# Patient Record
Sex: MALE | Race: BLACK OR AFRICAN AMERICAN | NOT HISPANIC OR LATINO | Employment: FULL TIME | URBAN - METROPOLITAN AREA
[De-identification: names, ages, dates, MRNs, and addresses within clinical notes are randomized per-mention and may not be internally consistent; named-entity substitution may affect disease eponyms.]

---

## 2023-03-16 ENCOUNTER — TELEPHONE (OUTPATIENT)
Dept: PRIMARY CARE | Facility: CLINIC | Age: 58
End: 2023-03-16
Payer: COMMERCIAL

## 2023-03-16 NOTE — TELEPHONE ENCOUNTER
Asking for a note stating he has not been unable to work since January 17th  Needs to be sent to [employer],  [phone]   Claim # [id number]

## 2023-04-17 ENCOUNTER — OFFICE VISIT (OUTPATIENT)
Dept: PRIMARY CARE | Facility: CLINIC | Age: 58
End: 2023-04-17
Payer: COMMERCIAL

## 2023-04-17 VITALS
DIASTOLIC BLOOD PRESSURE: 80 MMHG | OXYGEN SATURATION: 98 % | SYSTOLIC BLOOD PRESSURE: 118 MMHG | HEIGHT: 72 IN | BODY MASS INDEX: 34.54 KG/M2 | HEART RATE: 82 BPM | WEIGHT: 255 LBS

## 2023-04-17 DIAGNOSIS — G62.9 NEUROPATHY: ICD-10-CM

## 2023-04-17 DIAGNOSIS — E11.9 TYPE 2 DIABETES MELLITUS WITHOUT COMPLICATION, WITH LONG-TERM CURRENT USE OF INSULIN (MULTI): ICD-10-CM

## 2023-04-17 DIAGNOSIS — E78.5 HYPERLIPIDEMIA, UNSPECIFIED HYPERLIPIDEMIA TYPE: ICD-10-CM

## 2023-04-17 DIAGNOSIS — E78.5 HYPERLIPIDEMIA, UNSPECIFIED HYPERLIPIDEMIA TYPE: Primary | ICD-10-CM

## 2023-04-17 DIAGNOSIS — M17.0 PRIMARY LOCALIZED OSTEOARTHRITIS OF BOTH KNEES: Primary | ICD-10-CM

## 2023-04-17 DIAGNOSIS — I10 HYPERTENSION, UNSPECIFIED TYPE: ICD-10-CM

## 2023-04-17 DIAGNOSIS — Z79.4 TYPE 2 DIABETES MELLITUS WITHOUT COMPLICATION, WITH LONG-TERM CURRENT USE OF INSULIN (MULTI): ICD-10-CM

## 2023-04-17 DIAGNOSIS — I10 BENIGN ESSENTIAL HYPERTENSION: ICD-10-CM

## 2023-04-17 PROBLEM — R22.30 SKIN LUMP OF ARM: Status: ACTIVE | Noted: 2023-04-17

## 2023-04-17 PROBLEM — M62.830 BACK SPASM: Status: ACTIVE | Noted: 2023-04-17

## 2023-04-17 PROBLEM — M25.561 PAIN IN RIGHT KNEE: Status: ACTIVE | Noted: 2023-04-17

## 2023-04-17 PROBLEM — R91.8 PULMONARY NODULES: Status: ACTIVE | Noted: 2023-04-17

## 2023-04-17 PROBLEM — D17.21 LIPOMA OF RIGHT UPPER EXTREMITY: Status: ACTIVE | Noted: 2023-04-17

## 2023-04-17 PROBLEM — H91.90 HEARING LOSS: Status: ACTIVE | Noted: 2023-04-17

## 2023-04-17 PROBLEM — K21.9 GERD (GASTROESOPHAGEAL REFLUX DISEASE): Status: ACTIVE | Noted: 2023-04-17

## 2023-04-17 PROBLEM — R07.0 THROAT PAIN IN ADULT: Status: ACTIVE | Noted: 2023-04-17

## 2023-04-17 PROBLEM — H52.03 HYPERMETROPIA OF BOTH EYES: Status: ACTIVE | Noted: 2023-04-17

## 2023-04-17 PROBLEM — H52.13 MYOPIA OF BOTH EYES: Status: ACTIVE | Noted: 2023-04-17

## 2023-04-17 PROBLEM — R49.0 HOARSENESS OF VOICE: Status: ACTIVE | Noted: 2023-04-17

## 2023-04-17 PROBLEM — H90.3 BILATERAL SENSORINEURAL HEARING LOSS: Status: ACTIVE | Noted: 2023-04-17

## 2023-04-17 PROBLEM — G89.29 CHRONIC HEADACHES: Status: ACTIVE | Noted: 2023-04-17

## 2023-04-17 PROBLEM — M54.50 LOW BACK PAIN: Status: ACTIVE | Noted: 2023-04-17

## 2023-04-17 PROBLEM — G47.33 OBSTRUCTIVE SLEEP APNEA, ADULT: Status: ACTIVE | Noted: 2023-04-17

## 2023-04-17 PROBLEM — J38.3 LESION OF VOCAL CORD: Status: ACTIVE | Noted: 2023-04-17

## 2023-04-17 PROBLEM — N52.9 ORGANIC IMPOTENCE: Status: ACTIVE | Noted: 2023-04-17

## 2023-04-17 PROBLEM — R51.9 CHRONIC HEADACHES: Status: ACTIVE | Noted: 2023-04-17

## 2023-04-17 PROBLEM — M25.569 KNEE PAIN: Status: ACTIVE | Noted: 2023-04-17

## 2023-04-17 PROBLEM — F17.200 TOBACCO USE DISORDER: Status: ACTIVE | Noted: 2023-04-17

## 2023-04-17 PROBLEM — R22.30 ARM MASS: Status: ACTIVE | Noted: 2023-04-17

## 2023-04-17 PROBLEM — H47.393 OPTIC NERVE CUPPING OF BOTH EYES: Status: ACTIVE | Noted: 2023-04-17

## 2023-04-17 PROCEDURE — 3079F DIAST BP 80-89 MM HG: CPT | Performed by: INTERNAL MEDICINE

## 2023-04-17 PROCEDURE — 3074F SYST BP LT 130 MM HG: CPT | Performed by: INTERNAL MEDICINE

## 2023-04-17 PROCEDURE — 99213 OFFICE O/P EST LOW 20 MIN: CPT | Performed by: INTERNAL MEDICINE

## 2023-04-17 PROCEDURE — 1036F TOBACCO NON-USER: CPT | Performed by: INTERNAL MEDICINE

## 2023-04-17 PROCEDURE — 3051F HG A1C>EQUAL 7.0%<8.0%: CPT | Performed by: INTERNAL MEDICINE

## 2023-04-17 RX ORDER — ATORVASTATIN CALCIUM 10 MG/1
1 TABLET, FILM COATED ORAL DAILY
COMMUNITY
Start: 2017-06-29 | End: 2023-04-17 | Stop reason: SDUPTHER

## 2023-04-17 RX ORDER — GLYBURIDE-METFORMIN HYDROCHLORIDE 2.5; 5 MG/1; MG/1
2 TABLET ORAL 2 TIMES DAILY
COMMUNITY
Start: 2014-06-05 | End: 2023-12-14 | Stop reason: WASHOUT

## 2023-04-17 RX ORDER — DILTIAZEM HYDROCHLORIDE 240 MG/1
CAPSULE, COATED, EXTENDED RELEASE ORAL
COMMUNITY
Start: 2023-04-15 | End: 2023-04-17 | Stop reason: SDUPTHER

## 2023-04-17 RX ORDER — ATORVASTATIN CALCIUM 10 MG/1
TABLET, FILM COATED ORAL
Qty: 90 TABLET | Refills: 0 | Status: SHIPPED | OUTPATIENT
Start: 2023-04-17 | End: 2023-07-17 | Stop reason: SDUPTHER

## 2023-04-17 RX ORDER — DILTIAZEM HYDROCHLORIDE 240 MG/1
CAPSULE, COATED, EXTENDED RELEASE ORAL
Qty: 90 CAPSULE | Refills: 0 | Status: SHIPPED | OUTPATIENT
Start: 2023-04-17 | End: 2023-07-17 | Stop reason: SDUPTHER

## 2023-04-17 RX ORDER — DULAGLUTIDE 1.5 MG/.5ML
INJECTION, SOLUTION SUBCUTANEOUS
COMMUNITY
Start: 2022-09-20 | End: 2024-04-18 | Stop reason: ALTCHOICE

## 2023-04-17 RX ORDER — OMEPRAZOLE 20 MG/1
1 CAPSULE, DELAYED RELEASE ORAL DAILY
COMMUNITY
Start: 2017-11-21

## 2023-04-17 RX ORDER — FLASH GLUCOSE SENSOR
KIT MISCELLANEOUS
COMMUNITY
Start: 2023-04-01 | End: 2023-04-17 | Stop reason: SDUPTHER

## 2023-04-17 RX ORDER — METFORMIN HYDROCHLORIDE 1000 MG/1
1000 TABLET ORAL 2 TIMES DAILY
Qty: 180 TABLET | Refills: 1 | Status: SHIPPED | OUTPATIENT
Start: 2023-04-17 | End: 2023-12-14 | Stop reason: SDUPTHER

## 2023-04-17 RX ORDER — FLASH GLUCOSE SENSOR
KIT MISCELLANEOUS
Qty: 6 EACH | Refills: 1 | Status: SHIPPED | OUTPATIENT
Start: 2023-04-17 | End: 2023-12-14 | Stop reason: SDUPTHER

## 2023-04-17 RX ORDER — METFORMIN HYDROCHLORIDE 1000 MG/1
1 TABLET ORAL 2 TIMES DAILY
COMMUNITY
Start: 2023-01-24 | End: 2023-04-17 | Stop reason: SDUPTHER

## 2023-04-17 RX ORDER — ATORVASTATIN CALCIUM 10 MG/1
10 TABLET, FILM COATED ORAL DAILY
Qty: 90 TABLET | Refills: 1 | Status: SHIPPED | OUTPATIENT
Start: 2023-04-17 | End: 2024-01-11

## 2023-04-17 RX ORDER — GLIPIZIDE 5 MG/1
5 TABLET ORAL DAILY
Qty: 90 TABLET | Refills: 1 | Status: SHIPPED | OUTPATIENT
Start: 2023-04-17 | End: 2023-12-14 | Stop reason: SDUPTHER

## 2023-04-17 RX ORDER — IBUPROFEN 600 MG/1
TABLET ORAL
COMMUNITY
Start: 2018-02-02 | End: 2023-11-08 | Stop reason: SDUPTHER

## 2023-04-17 RX ORDER — GABAPENTIN 400 MG/1
CAPSULE ORAL
COMMUNITY
Start: 2014-08-12 | End: 2023-04-17 | Stop reason: SDUPTHER

## 2023-04-17 RX ORDER — GLIPIZIDE 5 MG/1
1 TABLET ORAL DAILY
COMMUNITY
Start: 2023-01-24 | End: 2023-04-17 | Stop reason: SDUPTHER

## 2023-04-17 RX ORDER — GABAPENTIN 400 MG/1
400 CAPSULE ORAL 3 TIMES DAILY
Qty: 360 CAPSULE | Refills: 1 | Status: SHIPPED | OUTPATIENT
Start: 2023-04-17 | End: 2024-03-07

## 2023-04-17 RX ORDER — DILTIAZEM HYDROCHLORIDE 240 MG/1
240 CAPSULE, COATED, EXTENDED RELEASE ORAL DAILY
Qty: 90 CAPSULE | Refills: 1 | Status: SHIPPED | OUTPATIENT
Start: 2023-04-17 | End: 2023-09-22

## 2023-04-17 RX ORDER — ACETAMINOPHEN 500 MG
TABLET ORAL
COMMUNITY
Start: 2022-07-05 | End: 2024-03-07

## 2023-04-17 NOTE — PROGRESS NOTES
Subjective   Patient ID: Migue Adair is a 58 y.o. male who presents for Follow-up.  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 gait instability from the had a knee injection that may be helped the pain for couple days then it came back  He also has chronic neuropathic paresthesia type pain of his feet over the years makes it hard to work and stand with this  Denies recent trauma redness swelling fever chills      Health Maintenance:      Colonoscopy: 2017      Mammogram:      Pelvic/Pap:      Low dose chest CT: 2023      Aorta duplex:      Optho:      Podiatry:        Vaccines:      Prevnar 20:      Prevnar 13:      Pneumovax 23:      Tdap:      Shingrix:      COVID:      Influenza:        ROS:      General: denies fever/chills/weight loss      Head: denies HA/trauma/masses/dizziness      Eyes: denies vision change/loss of vision/blurry vision/diplopia/eye pain      Ears: denies hearing loss/tinnitus/otalgia/otorrhea      Nose: denies nasal drainage/anosmia      Throat: denies dysphagia/odynophagia      Lymphatics: denies lymph node swelling      Cardiac: denies CP/palpitations/orthopnea/PND      Pulmonary: denies dyspnea/cough/wheezing      GI: denies abd pain/n/v/diarrhea/melena/hematochezia/hematemesis      : denies dysuria/hematuria/change frequency      Genital: denies genital discharge/lesions      Skin: denies rashes/lesions/masses      MSK: Chronic knee pain chronic pain in his feet denies weakness/swelling/edema/gait imbalance/pain      Neuro: denies paresthesias/seizures/dysarthria      Psych: denies depression/anxiety/suicidal or homicidal ideations            Objective   /80   Pulse 82   Ht 1.829 m (6')   Wt 116 kg (255 lb)   SpO2 98%   BMI 34.58 kg/m²       Physical Exam:     General: AO3, NAD     Head: atraumatic/NC     Eyes: EOMI/PERRLA. Negative APD     Ears: TM pearly gray, EAC clear. No lesions or erythema     Nose: symmetric nares, no discharge     Throat: trachea midline, uvula midline pink mucosa. No thyromegaly     Lymphatics: no cervical/supraclavicular/ant or posterior cervical adenopathy/axillary/inguinal adenopathy     Breast: not examined     Chest: no deformity or tenderness to palpation     Pulm: CTA b/l, no wheeze/rhonchi/rales. nonlabored     Cardiac: RRR +s1s2, no m/r/g.      GI: soft, NT/ND. Normoactive Bsx4. No rebound/guarding.     Rectal: no examined     MSK: Severe crepitance bilateral knees 5/5 strength UE LE. No edema/clubbing/cyanosis     Skin: no rashes/lesions     Vascular: 2+ palp DP PT radials b/l. Negative carotid bruit     Neuro: CNII-XII intact. No focal deficits. Reflexes 2/4 brachioradialis bicep tricep patellar achilles. Finger to nose intact.     Psych: appropriate mood/affect                    No results found for: BMPR1A, CBCDIF      Assessment/Plan   Diagnoses and all orders for this visit:  Primary localized osteoarthritis of both knees  Benign essential hypertension  -     atorvastatin (Lipitor) 10 mg tablet; Take 1 tablet (10 mg) by mouth once daily.  -     dilTIAZem CD (Cardizem CD) 240 mg 24 hr capsule; Take 1 capsule (240 mg) by mouth once daily.  Neuropathy  -     gabapentin (Neurontin) 400 mg capsule; Take 1 capsule (400 mg) by mouth in the morning and 1 capsule (400 mg) in the evening and 1 capsule (400 mg) before bedtime. 1 capsule po in the am and 1 capsule in the afternoon and 2 capsules before bedtime..  Type 2 diabetes mellitus without complication, with long-term current use of insulin (CMS/[facility])  -     FreeStyle Tesfaye 14 Day Sensor kit; Use as directed  -     glipiZIDE (Glucotrol) 5 mg tablet; Take 1 tablet (5 mg) by mouth once daily.  -     metFORMIN (Glucophage) 1,000 mg tablet; Take 1 tablet (1,000 mg) by  mouth in the morning and 1 tablet (1,000 mg) before bedtime.  Hyperlipidemia, unspecified hyperlipidemia type  -     atorvastatin (Lipitor) 10 mg tablet; Take 1 tablet (10 mg) by mouth once daily.    As per your request sent message to my  to fax over office note from today regarding your persistent symptoms and need for disability 1 [phone] as we discussed if you need more in-depth records or full records would recommend going through  medical records for this request    Screening blood work due July 2023    Thank you for making appointment jazmyne    Please follow-up in 3 months       Bianca Gregorio MA

## 2023-05-01 LAB
ALANINE AMINOTRANSFERASE (SGPT) (U/L) IN SER/PLAS: 22 U/L (ref 10–52)
ALBUMIN (G/DL) IN SER/PLAS: 3.9 G/DL (ref 3.4–5)
ALKALINE PHOSPHATASE (U/L) IN SER/PLAS: 51 U/L (ref 33–120)
ANION GAP IN SER/PLAS: 9 MMOL/L (ref 10–20)
ASPARTATE AMINOTRANSFERASE (SGOT) (U/L) IN SER/PLAS: 16 U/L (ref 9–39)
BILIRUBIN TOTAL (MG/DL) IN SER/PLAS: 0.6 MG/DL (ref 0–1.2)
CALCIUM (MG/DL) IN SER/PLAS: 9.2 MG/DL (ref 8.6–10.3)
CARBON DIOXIDE, TOTAL (MMOL/L) IN SER/PLAS: 30 MMOL/L (ref 21–32)
CHLORIDE (MMOL/L) IN SER/PLAS: 101 MMOL/L (ref 98–107)
CREATININE (MG/DL) IN SER/PLAS: 1.08 MG/DL (ref 0.5–1.3)
ESTIMATED AVERAGE GLUCOSE FOR HBA1C: 151 MG/DL
GFR MALE: 79 ML/MIN/1.73M2
GLUCOSE (MG/DL) IN SER/PLAS: 134 MG/DL (ref 74–99)
HEMOGLOBIN A1C/HEMOGLOBIN TOTAL IN BLOOD: 6.9 %
POTASSIUM (MMOL/L) IN SER/PLAS: 4.4 MMOL/L (ref 3.5–5.3)
PROTEIN TOTAL: 7 G/DL (ref 6.4–8.2)
SODIUM (MMOL/L) IN SER/PLAS: 136 MMOL/L (ref 136–145)
UREA NITROGEN (MG/DL) IN SER/PLAS: 13 MG/DL (ref 6–23)

## 2023-07-17 ENCOUNTER — OFFICE VISIT (OUTPATIENT)
Dept: PRIMARY CARE | Facility: CLINIC | Age: 58
End: 2023-07-17
Payer: COMMERCIAL

## 2023-07-17 VITALS
WEIGHT: 263 LBS | HEART RATE: 88 BPM | OXYGEN SATURATION: 97 % | SYSTOLIC BLOOD PRESSURE: 112 MMHG | DIASTOLIC BLOOD PRESSURE: 64 MMHG | HEIGHT: 74 IN | BODY MASS INDEX: 33.75 KG/M2

## 2023-07-17 DIAGNOSIS — M25.562 CHRONIC PAIN OF BOTH KNEES: ICD-10-CM

## 2023-07-17 DIAGNOSIS — M17.0 PRIMARY LOCALIZED OSTEOARTHRITIS OF BOTH KNEES: ICD-10-CM

## 2023-07-17 DIAGNOSIS — M25.561 CHRONIC PAIN OF BOTH KNEES: ICD-10-CM

## 2023-07-17 DIAGNOSIS — Z00.00 HEALTHCARE MAINTENANCE: Primary | ICD-10-CM

## 2023-07-17 DIAGNOSIS — G89.29 CHRONIC PAIN OF BOTH KNEES: ICD-10-CM

## 2023-07-17 DIAGNOSIS — G62.9 NEUROPATHY: ICD-10-CM

## 2023-07-17 PROCEDURE — 3074F SYST BP LT 130 MM HG: CPT | Performed by: INTERNAL MEDICINE

## 2023-07-17 PROCEDURE — 3044F HG A1C LEVEL LT 7.0%: CPT | Performed by: INTERNAL MEDICINE

## 2023-07-17 PROCEDURE — 1036F TOBACCO NON-USER: CPT | Performed by: INTERNAL MEDICINE

## 2023-07-17 PROCEDURE — 3078F DIAST BP <80 MM HG: CPT | Performed by: INTERNAL MEDICINE

## 2023-07-17 PROCEDURE — 99213 OFFICE O/P EST LOW 20 MIN: CPT | Performed by: INTERNAL MEDICINE

## 2023-07-17 NOTE — PROGRESS NOTES
"Subjective   Patient ID: Migue Adair is a 58 y.o. male who presents for Follow-up.  HPI        Patient presents for follow-up he has chronic pain for many years 8 out of 10 right greater than left achy arthralgias worse when sitting in the chair or when he puts weight on the legs  He states he is planning to have a right knee replacement in October  Chronic pain in the feet as well neuropathy pins-and-needles hard to walk on the feet  He experiences swelling as well of the knees  Denies recent trauma joint redness        Health Maintenance:      Colonoscopy: 2017      Mammogram:      Pelvic/Pap:      Low dose chest CT:      Aorta duplex:      Optho:      Podiatry:        Vaccines:      Prevnar 20:      Prevnar 13:      Pneumovax 23:      Tdap:      Shingrix:      COVID:      Influenza:        ROS:      General: denies fever/chills/weight loss      Head: denies HA/trauma/masses/dizziness      Eyes: denies vision change/loss of vision/blurry vision/diplopia/eye pain      Ears: denies hearing loss/tinnitus/otalgia/otorrhea      Nose: denies nasal drainage/anosmia      Throat: denies dysphagia/odynophagia      Lymphatics: denies lymph node swelling      Cardiac: denies CP/palpitations/orthopnea/PND      Pulmonary: denies dyspnea/cough/wheezing      GI: denies abd pain/n/v/diarrhea/melena/hematochezia/hematemesis      : denies dysuria/hematuria/change frequency      Genital: denies genital discharge/lesions      Skin: denies rashes/lesions/masses      MSK: Chronic bilateral knee arthralgia denies weakness/swelling/edema/gait imbalance/pain      Neuro: Chronic neuropathy pins-and-needles pain of the feet denies paresthesias/seizures/dysarthria      Psych: denies depression/anxiety/suicidal or homicidal ideations            Objective   /64   Pulse 100   Ht 1.88 m (6' 2\")   Wt 119 kg (263 lb)   SpO2 97%   BMI 33.77 kg/m²      Physical Exam:     General: AO3, NAD     Head: atraumatic/NC     Eyes: EOMI/PERRLA. " "Negative APD     Ears: not examined     Nose: symmetric nares, no discharge     Throat: trachea midline, uvula midline pink mucosa. No thyromegaly     Lymphatics: no cervical/supraclavicular/ant or posterior cervical adenopathy     Breast: not examined     Chest: no deformity or tenderness to palpation     Pulm: CTA b/l, no wheeze/rhonchi/rales. nonlabored     Cardiac: RRR +s1s2, no m/r/g.      GI: soft, NT/ND. Normoactive Bsx4. No rebound/guarding.     Rectal: no examined     MSK: Bilateral crepitance of the knees with mild edema no erythema warmth,  5/5 strength UE LE. No edema/clubbing/cyanosis.  Movement and sensation intact of the bilateral feet     Skin: no rashes/lesions     Vascular: 2+ palp DP PT radials b/l. Negative carotid bruit     Neuro: CNII-XII intact. No focal deficits.    Psych: appropriate mood/affect                    No results found for: \"BMPR1A\", \"CBCDIF\"      Assessment/Plan   Diagnoses and all orders for this visit:  Healthcare maintenance  -     CBC and Auto Differential; Future  -     Prostate Spec.Ag,Screen; Future  -     T4, free; Future  -     TSH; Future  -     Lipid panel; Future  Neuropathy  Comments:  Severe secondary to diabetes type 2  Primary localized osteoarthritis of both knees  Chronic pain of both knees  Comments:  Secondary to severe arthritis osteoarthritis  Screening blood work due May 2024    Thank you for making appointment today Migue    Please follow-up in 3 months         Bianca Gregorio MA  "

## 2023-07-20 ENCOUNTER — LAB (OUTPATIENT)
Dept: LAB | Facility: LAB | Age: 58
End: 2023-07-20
Payer: COMMERCIAL

## 2023-07-20 DIAGNOSIS — Z00.00 HEALTHCARE MAINTENANCE: ICD-10-CM

## 2023-07-20 LAB
BASOPHILS (10*3/UL) IN BLOOD BY AUTOMATED COUNT: 0.07 X10E9/L (ref 0–0.1)
BASOPHILS/100 LEUKOCYTES IN BLOOD BY AUTOMATED COUNT: 1 % (ref 0–2)
CHOLESTEROL (MG/DL) IN SER/PLAS: 141 MG/DL (ref 0–199)
CHOLESTEROL IN HDL (MG/DL) IN SER/PLAS: 49.7 MG/DL
CHOLESTEROL/HDL RATIO: 2.8
EOSINOPHILS (10*3/UL) IN BLOOD BY AUTOMATED COUNT: 0.11 X10E9/L (ref 0–0.7)
EOSINOPHILS/100 LEUKOCYTES IN BLOOD BY AUTOMATED COUNT: 1.6 % (ref 0–6)
ERYTHROCYTE DISTRIBUTION WIDTH (RATIO) BY AUTOMATED COUNT: 13.5 % (ref 11.5–14.5)
ERYTHROCYTE MEAN CORPUSCULAR HEMOGLOBIN CONCENTRATION (G/DL) BY AUTOMATED: 32.8 G/DL (ref 32–36)
ERYTHROCYTE MEAN CORPUSCULAR VOLUME (FL) BY AUTOMATED COUNT: 90 FL (ref 80–100)
ERYTHROCYTES (10*6/UL) IN BLOOD BY AUTOMATED COUNT: 4.67 X10E12/L (ref 4.5–5.9)
HEMATOCRIT (%) IN BLOOD BY AUTOMATED COUNT: 41.8 % (ref 41–52)
HEMOGLOBIN (G/DL) IN BLOOD: 13.7 G/DL (ref 13.5–17.5)
IMMATURE GRANULOCYTES/100 LEUKOCYTES IN BLOOD BY AUTOMATED COUNT: 0.3 % (ref 0–0.9)
LDL: 80 MG/DL (ref 0–99)
LEUKOCYTES (10*3/UL) IN BLOOD BY AUTOMATED COUNT: 6.7 X10E9/L (ref 4.4–11.3)
LYMPHOCYTES (10*3/UL) IN BLOOD BY AUTOMATED COUNT: 1.71 X10E9/L (ref 1.2–4.8)
LYMPHOCYTES/100 LEUKOCYTES IN BLOOD BY AUTOMATED COUNT: 25.6 % (ref 13–44)
MONOCYTES (10*3/UL) IN BLOOD BY AUTOMATED COUNT: 0.69 X10E9/L (ref 0.1–1)
MONOCYTES/100 LEUKOCYTES IN BLOOD BY AUTOMATED COUNT: 10.3 % (ref 2–10)
NEUTROPHILS (10*3/UL) IN BLOOD BY AUTOMATED COUNT: 4.09 X10E9/L (ref 1.2–7.7)
NEUTROPHILS/100 LEUKOCYTES IN BLOOD BY AUTOMATED COUNT: 61.2 % (ref 40–80)
PLATELETS (10*3/UL) IN BLOOD AUTOMATED COUNT: 274 X10E9/L (ref 150–450)
PROSTATE SPECIFIC ANTIGEN,SCREEN: 1.82 NG/ML (ref 0–4)
THYROTROPIN (MIU/L) IN SER/PLAS BY DETECTION LIMIT <= 0.05 MIU/L: 2.25 MIU/L (ref 0.44–3.98)
THYROXINE (T4) FREE (NG/DL) IN SER/PLAS: 0.81 NG/DL (ref 0.61–1.12)
TRIGLYCERIDE (MG/DL) IN SER/PLAS: 55 MG/DL (ref 0–149)
VLDL: 11 MG/DL (ref 0–40)

## 2023-07-20 PROCEDURE — 80061 LIPID PANEL: CPT

## 2023-07-20 PROCEDURE — 84153 ASSAY OF PSA TOTAL: CPT

## 2023-07-20 PROCEDURE — 84439 ASSAY OF FREE THYROXINE: CPT

## 2023-07-20 PROCEDURE — 36415 COLL VENOUS BLD VENIPUNCTURE: CPT

## 2023-07-20 PROCEDURE — 84443 ASSAY THYROID STIM HORMONE: CPT

## 2023-07-20 PROCEDURE — 85025 COMPLETE CBC W/AUTO DIFF WBC: CPT

## 2023-08-30 ENCOUNTER — HOSPITAL ENCOUNTER (OUTPATIENT)
Dept: DATA CONVERSION | Facility: HOSPITAL | Age: 58
End: 2023-08-30

## 2023-08-30 DIAGNOSIS — M17.11 UNILATERAL PRIMARY OSTEOARTHRITIS, RIGHT KNEE: ICD-10-CM

## 2023-08-30 DIAGNOSIS — M25.561 PAIN IN RIGHT KNEE: ICD-10-CM

## 2023-09-22 DIAGNOSIS — I10 BENIGN ESSENTIAL HYPERTENSION: ICD-10-CM

## 2023-09-22 RX ORDER — DILTIAZEM HYDROCHLORIDE 240 MG/1
240 CAPSULE, COATED, EXTENDED RELEASE ORAL DAILY
Qty: 90 CAPSULE | Refills: 0 | Status: SHIPPED | OUTPATIENT
Start: 2023-09-22 | End: 2023-12-21

## 2023-09-27 ENCOUNTER — HOSPITAL ENCOUNTER (OUTPATIENT)
Dept: DATA CONVERSION | Facility: HOSPITAL | Age: 58
Discharge: HOME | End: 2023-09-27
Payer: COMMERCIAL

## 2023-09-27 DIAGNOSIS — M17.11 UNILATERAL PRIMARY OSTEOARTHRITIS, RIGHT KNEE: ICD-10-CM

## 2023-09-27 DIAGNOSIS — M25.561 PAIN IN RIGHT KNEE: ICD-10-CM

## 2023-09-27 LAB
ALBUMIN SERPL-MCNC: 4.1 GM/DL (ref 3.5–5)
ALBUMIN/GLOB SERPL: 1.3 RATIO (ref 1.5–3)
ALP BLD-CCNC: 70 U/L (ref 35–125)
ALT SERPL-CCNC: 20 U/L (ref 5–40)
ANION GAP SERPL CALCULATED.3IONS-SCNC: 9 MMOL/L (ref 0–19)
ANTICOAGULANT: NORMAL
APTT PPP: 24.8 SEC (ref 22–32.5)
AST SERPL-CCNC: 16 U/L (ref 5–40)
BASOPHILS # BLD AUTO: 0.03 K/UL (ref 0–0.22)
BASOPHILS NFR BLD AUTO: 0.5 % (ref 0–1)
BILIRUB SERPL-MCNC: 0.3 MG/DL (ref 0.1–1.2)
BILIRUB UR QL STRIP.AUTO: NEGATIVE
BUN SERPL-MCNC: 12 MG/DL (ref 8–25)
BUN/CREAT SERPL: 10.9 RATIO (ref 8–21)
CALCIUM SERPL-MCNC: 9.9 MG/DL (ref 8.5–10.4)
CHLORIDE SERPL-SCNC: 104 MMOL/L (ref 97–107)
CLARITY UR: CLEAR
CO2 SERPL-SCNC: 28 MMOL/L (ref 24–31)
COLOR UR: YELLOW
CREAT SERPL-MCNC: 1.1 MG/DL (ref 0.4–1.6)
DEPRECATED RDW RBC AUTO: 45.2 FL (ref 37–54)
DIFFERENTIAL METHOD BLD: ABNORMAL
EOSINOPHIL # BLD AUTO: 0.14 K/UL (ref 0–0.45)
EOSINOPHIL NFR BLD: 2.4 % (ref 0–3)
ERYTHROCYTE [DISTWIDTH] IN BLOOD BY AUTOMATED COUNT: 13.1 % (ref 11.7–15)
GFR SERPL CREATININE-BSD FRML MDRD: 77 ML/MIN/1.73 M2
GLOBULIN SER-MCNC: 3.1 G/DL (ref 1.9–3.7)
GLUCOSE SERPL-MCNC: 117 MG/DL (ref 65–99)
GLUCOSE UR STRIP.AUTO-MCNC: NEGATIVE MG/DL
HCT VFR BLD AUTO: 42.8 % (ref 41–50)
HGB BLD-MCNC: 13.5 GM/DL (ref 13.5–16.5)
HGB UR QL STRIP.AUTO: NORMAL /HPF (ref 0–3)
HGB UR QL: NEGATIVE
IMM GRANULOCYTES # BLD AUTO: 0 K/UL (ref 0–0.1)
INR PPP: 1 (ref 0.86–1.16)
KETONES UR QL STRIP.AUTO: NEGATIVE
LEUKOCYTE ESTERASE UR QL STRIP.AUTO: NEGATIVE
LYMPHOCYTES # BLD AUTO: 1.3 K/UL (ref 1.2–3.2)
LYMPHOCYTES NFR BLD MANUAL: 22.6 % (ref 20–40)
MCH RBC QN AUTO: 29 PG (ref 26–34)
MCHC RBC AUTO-ENTMCNC: 31.5 % (ref 31–37)
MCV RBC AUTO: 92 FL (ref 80–100)
MICROSCOPIC (UA): NORMAL
MONOCYTES # BLD AUTO: 0.61 K/UL (ref 0–0.8)
MONOCYTES NFR BLD MANUAL: 10.6 % (ref 0–8)
MRSA DNA SPEC QL NAA+PROBE: NEGATIVE
NEUTROPHILS # BLD AUTO: 3.66 K/UL
NEUTROPHILS # BLD AUTO: 3.66 K/UL (ref 1.8–7.7)
NEUTROPHILS.IMMATURE NFR BLD: 0 % (ref 0–1)
NEUTS SEG NFR BLD: 63.9 % (ref 50–70)
NITRITE UR QL STRIP.AUTO: NEGATIVE
PH UR STRIP.AUTO: 6 [PH] (ref 4.6–8)
PLATELET # BLD AUTO: 247 K/UL (ref 150–450)
PMV BLD AUTO: 8.9 CU (ref 7–12.6)
POTASSIUM SERPL-SCNC: 4.3 MMOL/L (ref 3.4–5.1)
PROT SERPL-MCNC: 7.2 G/DL (ref 5.9–7.9)
PROT UR STRIP.AUTO-MCNC: NEGATIVE MG/DL
PROTHROMBIN TIME: 9.6 SEC (ref 9.3–12.7)
RBC # BLD AUTO: 4.65 M/UL (ref 4.5–5.5)
SODIUM SERPL-SCNC: 141 MMOL/L (ref 133–145)
SP GR UR STRIP.AUTO: 1.02 (ref 1–1.03)
SPECIMEN SOURCE: NORMAL
URINE CULTURE: NORMAL
UROBILINOGEN UR QL STRIP.AUTO: 0.2 MG/DL (ref 0–1)
WBC # BLD AUTO: 5.7 K/UL (ref 4.5–11)
WBC #/AREA URNS AUTO: NORMAL /HPF (ref 0–3)

## 2023-10-05 ENCOUNTER — ANESTHESIA EVENT (OUTPATIENT)
Dept: OPERATING ROOM | Facility: HOSPITAL | Age: 58
End: 2023-10-05
Payer: COMMERCIAL

## 2023-10-05 PROBLEM — M17.11 PRIMARY OSTEOARTHRITIS OF RIGHT KNEE: Status: ACTIVE | Noted: 2023-10-05

## 2023-10-05 RX ORDER — OXYCODONE HYDROCHLORIDE 5 MG/1
5-10 TABLET ORAL EVERY 6 HOURS PRN
Qty: 40 TABLET | Refills: 0 | Status: SHIPPED | OUTPATIENT
Start: 2023-10-05 | End: 2023-10-12

## 2023-10-05 RX ORDER — DOCUSATE SODIUM 100 MG/1
100 CAPSULE, LIQUID FILLED ORAL 2 TIMES DAILY
Qty: 30 CAPSULE | Refills: 0 | Status: SHIPPED | OUTPATIENT
Start: 2023-10-05 | End: 2024-04-18 | Stop reason: ALTCHOICE

## 2023-10-05 RX ORDER — TRAMADOL HYDROCHLORIDE 50 MG/1
50-100 TABLET ORAL EVERY 6 HOURS PRN
Qty: 40 TABLET | Refills: 0 | Status: SHIPPED | OUTPATIENT
Start: 2023-10-05 | End: 2023-10-12

## 2023-10-05 RX ORDER — SENNOSIDES 8.6 MG/1
1 TABLET ORAL DAILY
Qty: 15 TABLET | Refills: 0 | Status: SHIPPED | OUTPATIENT
Start: 2023-10-05 | End: 2024-04-18 | Stop reason: ALTCHOICE

## 2023-10-05 RX ORDER — CEFADROXIL 500 MG/1
500 CAPSULE ORAL 2 TIMES DAILY
Qty: 10 CAPSULE | Refills: 0 | Status: SHIPPED | OUTPATIENT
Start: 2023-10-05 | End: 2023-10-11

## 2023-10-05 RX ORDER — ONDANSETRON 4 MG/1
4 TABLET, FILM COATED ORAL EVERY 8 HOURS PRN
Qty: 20 TABLET | Refills: 0 | Status: SHIPPED | OUTPATIENT
Start: 2023-10-05 | End: 2024-04-18 | Stop reason: ALTCHOICE

## 2023-10-05 RX ORDER — NAPROXEN SODIUM 220 MG/1
81 TABLET, FILM COATED ORAL 2 TIMES DAILY
Qty: 60 TABLET | Refills: 0 | Status: SHIPPED | OUTPATIENT
Start: 2023-10-05 | End: 2023-11-05

## 2023-10-06 ENCOUNTER — HOSPITAL ENCOUNTER (OUTPATIENT)
Facility: HOSPITAL | Age: 58
Discharge: HOME | End: 2023-10-06
Attending: STUDENT IN AN ORGANIZED HEALTH CARE EDUCATION/TRAINING PROGRAM | Admitting: STUDENT IN AN ORGANIZED HEALTH CARE EDUCATION/TRAINING PROGRAM
Payer: COMMERCIAL

## 2023-10-06 ENCOUNTER — APPOINTMENT (OUTPATIENT)
Dept: RADIOLOGY | Facility: HOSPITAL | Age: 58
End: 2023-10-06
Payer: COMMERCIAL

## 2023-10-06 ENCOUNTER — ANESTHESIA (OUTPATIENT)
Dept: OPERATING ROOM | Facility: HOSPITAL | Age: 58
End: 2023-10-06
Payer: COMMERCIAL

## 2023-10-06 ENCOUNTER — PHARMACY VISIT (OUTPATIENT)
Dept: PHARMACY | Facility: CLINIC | Age: 58
End: 2023-10-06
Payer: COMMERCIAL

## 2023-10-06 ENCOUNTER — HOME HEALTH ADMISSION (OUTPATIENT)
Dept: HOME HEALTH SERVICES | Facility: HOME HEALTH | Age: 58
End: 2023-10-06
Payer: COMMERCIAL

## 2023-10-06 VITALS
BODY MASS INDEX: 33.81 KG/M2 | SYSTOLIC BLOOD PRESSURE: 152 MMHG | WEIGHT: 263.45 LBS | OXYGEN SATURATION: 96 % | HEIGHT: 74 IN | DIASTOLIC BLOOD PRESSURE: 85 MMHG | TEMPERATURE: 97.3 F | HEART RATE: 86 BPM | RESPIRATION RATE: 16 BRPM

## 2023-10-06 DIAGNOSIS — M17.11 PRIMARY OSTEOARTHRITIS OF RIGHT KNEE: Primary | ICD-10-CM

## 2023-10-06 PROBLEM — G47.33 OSA (OBSTRUCTIVE SLEEP APNEA): Status: ACTIVE | Noted: 2023-10-06

## 2023-10-06 PROBLEM — I10 HTN (HYPERTENSION): Status: ACTIVE | Noted: 2023-10-06

## 2023-10-06 PROBLEM — G62.9 PERIPHERAL NEUROPATHY: Status: ACTIVE | Noted: 2023-10-06

## 2023-10-06 PROBLEM — K21.9 GASTROESOPHAGEAL REFLUX DISEASE: Status: ACTIVE | Noted: 2023-10-06

## 2023-10-06 PROBLEM — E66.9 OBESITY: Status: ACTIVE | Noted: 2023-10-06

## 2023-10-06 PROBLEM — E11.9 DIABETES MELLITUS, TYPE 2 (MULTI): Status: ACTIVE | Noted: 2023-10-06

## 2023-10-06 LAB — GLUCOSE BLD MANUAL STRIP-MCNC: 138 MG/DL (ref 74–99)

## 2023-10-06 PROCEDURE — 82947 ASSAY GLUCOSE BLOOD QUANT: CPT

## 2023-10-06 PROCEDURE — 7100000011 HC EXTENDED STAY RECOVERY HOURLY - NURSING UNIT

## 2023-10-06 PROCEDURE — 7100000002 HC RECOVERY ROOM TIME - EACH INCREMENTAL 1 MINUTE: Performed by: STUDENT IN AN ORGANIZED HEALTH CARE EDUCATION/TRAINING PROGRAM

## 2023-10-06 PROCEDURE — 2500000001 HC RX 250 WO HCPCS SELF ADMINISTERED DRUGS (ALT 637 FOR MEDICARE OP): Performed by: STUDENT IN AN ORGANIZED HEALTH CARE EDUCATION/TRAINING PROGRAM

## 2023-10-06 PROCEDURE — 2500000004 HC RX 250 GENERAL PHARMACY W/ HCPCS (ALT 636 FOR OP/ED): Performed by: STUDENT IN AN ORGANIZED HEALTH CARE EDUCATION/TRAINING PROGRAM

## 2023-10-06 PROCEDURE — RXMED WILLOW AMBULATORY MEDICATION CHARGE

## 2023-10-06 PROCEDURE — 3700000002 HC GENERAL ANESTHESIA TIME - EACH INCREMENTAL 1 MINUTE: Performed by: STUDENT IN AN ORGANIZED HEALTH CARE EDUCATION/TRAINING PROGRAM

## 2023-10-06 PROCEDURE — 7100000009 HC PHASE TWO TIME - INITIAL BASE CHARGE: Performed by: STUDENT IN AN ORGANIZED HEALTH CARE EDUCATION/TRAINING PROGRAM

## 2023-10-06 PROCEDURE — 2500000005 HC RX 250 GENERAL PHARMACY W/O HCPCS: Performed by: ANESTHESIOLOGIST ASSISTANT

## 2023-10-06 PROCEDURE — 3600000018 HC OR TIME - INITIAL BASE CHARGE - PROCEDURE LEVEL SIX: Performed by: STUDENT IN AN ORGANIZED HEALTH CARE EDUCATION/TRAINING PROGRAM

## 2023-10-06 PROCEDURE — 97116 GAIT TRAINING THERAPY: CPT | Mod: GP

## 2023-10-06 PROCEDURE — 97110 THERAPEUTIC EXERCISES: CPT | Mod: GP

## 2023-10-06 PROCEDURE — 97530 THERAPEUTIC ACTIVITIES: CPT | Mod: GP

## 2023-10-06 PROCEDURE — 97162 PT EVAL MOD COMPLEX 30 MIN: CPT | Mod: GP

## 2023-10-06 PROCEDURE — 2720000007 HC OR 272 NO HCPCS: Performed by: STUDENT IN AN ORGANIZED HEALTH CARE EDUCATION/TRAINING PROGRAM

## 2023-10-06 PROCEDURE — 76942 ECHO GUIDE FOR BIOPSY: CPT | Performed by: ANESTHESIOLOGY

## 2023-10-06 PROCEDURE — 3700000001 HC GENERAL ANESTHESIA TIME - INITIAL BASE CHARGE: Performed by: STUDENT IN AN ORGANIZED HEALTH CARE EDUCATION/TRAINING PROGRAM

## 2023-10-06 PROCEDURE — 2780000003 HC OR 278 NO HCPCS: Performed by: STUDENT IN AN ORGANIZED HEALTH CARE EDUCATION/TRAINING PROGRAM

## 2023-10-06 PROCEDURE — A27447 PR TOTAL KNEE ARTHROPLASTY: Performed by: ANESTHESIOLOGY

## 2023-10-06 PROCEDURE — 2500000005 HC RX 250 GENERAL PHARMACY W/O HCPCS: Performed by: STUDENT IN AN ORGANIZED HEALTH CARE EDUCATION/TRAINING PROGRAM

## 2023-10-06 PROCEDURE — 27447 TOTAL KNEE ARTHROPLASTY: CPT | Performed by: STUDENT IN AN ORGANIZED HEALTH CARE EDUCATION/TRAINING PROGRAM

## 2023-10-06 PROCEDURE — 2500000004 HC RX 250 GENERAL PHARMACY W/ HCPCS (ALT 636 FOR OP/ED): Performed by: ANESTHESIOLOGIST ASSISTANT

## 2023-10-06 PROCEDURE — 2580000001 HC RX 258 IV SOLUTIONS: Performed by: STUDENT IN AN ORGANIZED HEALTH CARE EDUCATION/TRAINING PROGRAM

## 2023-10-06 PROCEDURE — 2580000001 HC RX 258 IV SOLUTIONS: Performed by: ANESTHESIOLOGY

## 2023-10-06 PROCEDURE — 73560 X-RAY EXAM OF KNEE 1 OR 2: CPT | Mod: RT

## 2023-10-06 PROCEDURE — A27447 PR TOTAL KNEE ARTHROPLASTY: Performed by: ANESTHESIOLOGIST ASSISTANT

## 2023-10-06 PROCEDURE — 3600000017 HC OR TIME - EACH INCREMENTAL 1 MINUTE - PROCEDURE LEVEL SIX: Performed by: STUDENT IN AN ORGANIZED HEALTH CARE EDUCATION/TRAINING PROGRAM

## 2023-10-06 PROCEDURE — 7100000010 HC PHASE TWO TIME - EACH INCREMENTAL 1 MINUTE: Performed by: STUDENT IN AN ORGANIZED HEALTH CARE EDUCATION/TRAINING PROGRAM

## 2023-10-06 PROCEDURE — 2500000004 HC RX 250 GENERAL PHARMACY W/ HCPCS (ALT 636 FOR OP/ED): Performed by: ANESTHESIOLOGY

## 2023-10-06 PROCEDURE — 7100000001 HC RECOVERY ROOM TIME - INITIAL BASE CHARGE: Performed by: STUDENT IN AN ORGANIZED HEALTH CARE EDUCATION/TRAINING PROGRAM

## 2023-10-06 PROCEDURE — C1776 JOINT DEVICE (IMPLANTABLE): HCPCS | Performed by: STUDENT IN AN ORGANIZED HEALTH CARE EDUCATION/TRAINING PROGRAM

## 2023-10-06 DEVICE — ATTUNE PATELLA MEDIALIZED DOME 38MM CEMENTED AOX
Type: IMPLANTABLE DEVICE | Site: KNEE | Status: FUNCTIONAL
Brand: ATTUNE

## 2023-10-06 RX ORDER — ACETAMINOPHEN 325 MG/1
650 TABLET ORAL ONCE
Status: COMPLETED | OUTPATIENT
Start: 2023-10-06 | End: 2023-10-06

## 2023-10-06 RX ORDER — IPRATROPIUM BROMIDE 0.5 MG/2.5ML
500 SOLUTION RESPIRATORY (INHALATION) ONCE
Status: DISCONTINUED | OUTPATIENT
Start: 2023-10-06 | End: 2023-10-06 | Stop reason: HOSPADM

## 2023-10-06 RX ORDER — ALBUTEROL SULFATE 0.83 MG/ML
2.5 SOLUTION RESPIRATORY (INHALATION) ONCE
Status: DISCONTINUED | OUTPATIENT
Start: 2023-10-06 | End: 2023-10-06 | Stop reason: HOSPADM

## 2023-10-06 RX ORDER — ROPIVACAINE/EPI/CLONIDINE/KET 2.46-0.005
SYRINGE (ML) INJECTION AS NEEDED
Status: DISCONTINUED | OUTPATIENT
Start: 2023-10-06 | End: 2023-10-06 | Stop reason: HOSPADM

## 2023-10-06 RX ORDER — SODIUM CHLORIDE, SODIUM LACTATE, POTASSIUM CHLORIDE, CALCIUM CHLORIDE 600; 310; 30; 20 MG/100ML; MG/100ML; MG/100ML; MG/100ML
100 INJECTION, SOLUTION INTRAVENOUS CONTINUOUS
Status: DISCONTINUED | OUTPATIENT
Start: 2023-10-06 | End: 2023-10-06 | Stop reason: HOSPADM

## 2023-10-06 RX ORDER — DILTIAZEM HYDROCHLORIDE 240 MG/1
240 CAPSULE, COATED, EXTENDED RELEASE ORAL DAILY
COMMUNITY
End: 2023-11-08 | Stop reason: SDUPTHER

## 2023-10-06 RX ORDER — PROPOFOL 10 MG/ML
INJECTION, EMULSION INTRAVENOUS AS NEEDED
Status: DISCONTINUED | OUTPATIENT
Start: 2023-10-06 | End: 2023-10-06

## 2023-10-06 RX ORDER — ONDANSETRON HYDROCHLORIDE 2 MG/ML
4 INJECTION, SOLUTION INTRAVENOUS EVERY 8 HOURS PRN
Status: DISCONTINUED | OUTPATIENT
Start: 2023-10-06 | End: 2023-10-06 | Stop reason: HOSPADM

## 2023-10-06 RX ORDER — OXYCODONE HYDROCHLORIDE 5 MG/1
5 TABLET ORAL EVERY 6 HOURS PRN
Status: DISCONTINUED | OUTPATIENT
Start: 2023-10-06 | End: 2023-10-06 | Stop reason: HOSPADM

## 2023-10-06 RX ORDER — NALOXONE HYDROCHLORIDE 0.4 MG/ML
0.2 INJECTION, SOLUTION INTRAMUSCULAR; INTRAVENOUS; SUBCUTANEOUS EVERY 5 MIN PRN
Status: DISCONTINUED | OUTPATIENT
Start: 2023-10-06 | End: 2023-10-06 | Stop reason: HOSPADM

## 2023-10-06 RX ORDER — DEXTROSE 50 % IN WATER (D50W) INTRAVENOUS SYRINGE
25
Status: DISCONTINUED | OUTPATIENT
Start: 2023-10-06 | End: 2023-10-06 | Stop reason: HOSPADM

## 2023-10-06 RX ORDER — KETOROLAC TROMETHAMINE 15 MG/ML
15 INJECTION, SOLUTION INTRAMUSCULAR; INTRAVENOUS EVERY 6 HOURS
Status: DISCONTINUED | OUTPATIENT
Start: 2023-10-06 | End: 2023-10-06 | Stop reason: HOSPADM

## 2023-10-06 RX ORDER — LIDOCAINE HYDROCHLORIDE 10 MG/ML
0.1 INJECTION, SOLUTION EPIDURAL; INFILTRATION; INTRACAUDAL; PERINEURAL ONCE
Status: DISCONTINUED | OUTPATIENT
Start: 2023-10-06 | End: 2023-10-06 | Stop reason: HOSPADM

## 2023-10-06 RX ORDER — POLYETHYLENE GLYCOL 3350 17 G/17G
17 POWDER, FOR SOLUTION ORAL DAILY
Status: DISCONTINUED | OUTPATIENT
Start: 2023-10-06 | End: 2023-10-06 | Stop reason: HOSPADM

## 2023-10-06 RX ORDER — ATORVASTATIN CALCIUM 10 MG/1
10 TABLET, FILM COATED ORAL DAILY
COMMUNITY
End: 2023-11-08 | Stop reason: SDUPTHER

## 2023-10-06 RX ORDER — DIPHENHYDRAMINE HCL 25 MG
12.5 TABLET ORAL EVERY 6 HOURS PRN
Status: DISCONTINUED | OUTPATIENT
Start: 2023-10-06 | End: 2023-10-06 | Stop reason: HOSPADM

## 2023-10-06 RX ORDER — SODIUM CHLORIDE, SODIUM LACTATE, POTASSIUM CHLORIDE, CALCIUM CHLORIDE 600; 310; 30; 20 MG/100ML; MG/100ML; MG/100ML; MG/100ML
100 INJECTION, SOLUTION INTRAVENOUS CONTINUOUS
Status: DISCONTINUED | OUTPATIENT
Start: 2023-10-06 | End: 2023-10-06

## 2023-10-06 RX ORDER — ASPIRIN 81 MG/1
81 TABLET ORAL 2 TIMES DAILY
Status: CANCELLED | OUTPATIENT
Start: 2023-10-06

## 2023-10-06 RX ORDER — DILTIAZEM HYDROCHLORIDE 120 MG/1
240 CAPSULE, COATED, EXTENDED RELEASE ORAL DAILY
Status: CANCELLED | OUTPATIENT
Start: 2023-10-06

## 2023-10-06 RX ORDER — ONDANSETRON 4 MG/1
4 TABLET, ORALLY DISINTEGRATING ORAL EVERY 8 HOURS PRN
Status: DISCONTINUED | OUTPATIENT
Start: 2023-10-06 | End: 2023-10-06 | Stop reason: HOSPADM

## 2023-10-06 RX ORDER — GABAPENTIN 400 MG/1
400 CAPSULE ORAL 3 TIMES DAILY
Status: CANCELLED | OUTPATIENT
Start: 2023-10-06

## 2023-10-06 RX ORDER — ACETAMINOPHEN 325 MG/1
975 TABLET ORAL 3 TIMES DAILY
Status: DISCONTINUED | OUTPATIENT
Start: 2023-10-06 | End: 2023-10-06 | Stop reason: HOSPADM

## 2023-10-06 RX ORDER — IPRATROPIUM BROMIDE 0.5 MG/2.5ML
500 SOLUTION RESPIRATORY (INHALATION) ONCE
Status: DISCONTINUED | OUTPATIENT
Start: 2023-10-06 | End: 2023-10-06

## 2023-10-06 RX ORDER — ACETAMINOPHEN 325 MG/1
975 TABLET ORAL 3 TIMES DAILY
Status: DISCONTINUED | OUTPATIENT
Start: 2023-10-06 | End: 2023-10-06

## 2023-10-06 RX ORDER — MIDAZOLAM HYDROCHLORIDE 1 MG/ML
2 INJECTION, SOLUTION INTRAMUSCULAR; INTRAVENOUS ONCE
Status: COMPLETED | OUTPATIENT
Start: 2023-10-06 | End: 2023-10-06

## 2023-10-06 RX ORDER — CEFAZOLIN SODIUM 2 G/100ML
2 INJECTION, SOLUTION INTRAVENOUS EVERY 8 HOURS
Status: DISCONTINUED | OUTPATIENT
Start: 2023-10-06 | End: 2023-10-06 | Stop reason: HOSPADM

## 2023-10-06 RX ORDER — DULAGLUTIDE 1.5 MG/.5ML
1.5 INJECTION, SOLUTION SUBCUTANEOUS
COMMUNITY
End: 2023-12-14 | Stop reason: SDUPTHER

## 2023-10-06 RX ORDER — IBUPROFEN 600 MG/1
600 TABLET ORAL EVERY 6 HOURS PRN
COMMUNITY
End: 2024-03-07

## 2023-10-06 RX ORDER — PROPOFOL 10 MG/ML
INJECTION, EMULSION INTRAVENOUS CONTINUOUS PRN
Status: DISCONTINUED | OUTPATIENT
Start: 2023-10-06 | End: 2023-10-06

## 2023-10-06 RX ORDER — METFORMIN HYDROCHLORIDE EXTENDED-RELEASE TABLETS 1000 MG/1
1000 TABLET, FILM COATED, EXTENDED RELEASE ORAL
COMMUNITY
End: 2023-11-08 | Stop reason: SDUPTHER

## 2023-10-06 RX ORDER — DEXTROSE MONOHYDRATE 100 MG/ML
0.3 INJECTION, SOLUTION INTRAVENOUS ONCE AS NEEDED
Status: DISCONTINUED | OUTPATIENT
Start: 2023-10-06 | End: 2023-10-06 | Stop reason: HOSPADM

## 2023-10-06 RX ORDER — TRAMADOL HYDROCHLORIDE 50 MG/1
50 TABLET ORAL EVERY 6 HOURS PRN
Status: DISCONTINUED | OUTPATIENT
Start: 2023-10-06 | End: 2023-10-06 | Stop reason: HOSPADM

## 2023-10-06 RX ORDER — ONDANSETRON HYDROCHLORIDE 2 MG/ML
INJECTION, SOLUTION INTRAVENOUS AS NEEDED
Status: DISCONTINUED | OUTPATIENT
Start: 2023-10-06 | End: 2023-10-06

## 2023-10-06 RX ORDER — GLIPIZIDE 5 MG/1
5 TABLET, FILM COATED, EXTENDED RELEASE ORAL DAILY
COMMUNITY
End: 2023-11-08 | Stop reason: SDUPTHER

## 2023-10-06 RX ORDER — LABETALOL HYDROCHLORIDE 5 MG/ML
5 INJECTION, SOLUTION INTRAVENOUS ONCE AS NEEDED
Status: DISCONTINUED | OUTPATIENT
Start: 2023-10-06 | End: 2023-10-06 | Stop reason: HOSPADM

## 2023-10-06 RX ORDER — GABAPENTIN 400 MG/1
400 CAPSULE ORAL 3 TIMES DAILY
COMMUNITY
End: 2023-11-08 | Stop reason: SDUPTHER

## 2023-10-06 RX ORDER — LABETALOL HYDROCHLORIDE 5 MG/ML
INJECTION, SOLUTION INTRAVENOUS AS NEEDED
Status: DISCONTINUED | OUTPATIENT
Start: 2023-10-06 | End: 2023-10-06

## 2023-10-06 RX ORDER — FERROUS SULFATE 325(65) MG
65 TABLET ORAL
Status: DISCONTINUED | OUTPATIENT
Start: 2023-10-06 | End: 2023-10-06 | Stop reason: HOSPADM

## 2023-10-06 RX ORDER — DOCUSATE SODIUM 100 MG/1
100 CAPSULE, LIQUID FILLED ORAL 2 TIMES DAILY
Status: DISCONTINUED | OUTPATIENT
Start: 2023-10-06 | End: 2023-10-06 | Stop reason: HOSPADM

## 2023-10-06 RX ORDER — ALBUTEROL SULFATE 0.83 MG/ML
2.5 SOLUTION RESPIRATORY (INHALATION) ONCE
Status: DISCONTINUED | OUTPATIENT
Start: 2023-10-06 | End: 2023-10-06

## 2023-10-06 RX ORDER — ATORVASTATIN CALCIUM 20 MG/1
10 TABLET, FILM COATED ORAL DAILY
Status: CANCELLED | OUTPATIENT
Start: 2023-10-06

## 2023-10-06 RX ORDER — ALBUTEROL SULFATE 0.83 MG/ML
2.5 SOLUTION RESPIRATORY (INHALATION) ONCE AS NEEDED
Status: DISCONTINUED | OUTPATIENT
Start: 2023-10-06 | End: 2023-10-06

## 2023-10-06 RX ORDER — ALBUTEROL SULFATE 0.83 MG/ML
2.5 SOLUTION RESPIRATORY (INHALATION) ONCE AS NEEDED
Status: DISCONTINUED | OUTPATIENT
Start: 2023-10-06 | End: 2023-10-06 | Stop reason: HOSPADM

## 2023-10-06 RX ORDER — FENTANYL CITRATE 50 UG/ML
INJECTION, SOLUTION INTRAMUSCULAR; INTRAVENOUS AS NEEDED
Status: DISCONTINUED | OUTPATIENT
Start: 2023-10-06 | End: 2023-10-06

## 2023-10-06 RX ORDER — PANTOPRAZOLE SODIUM 40 MG/1
40 TABLET, DELAYED RELEASE ORAL
Status: DISCONTINUED | OUTPATIENT
Start: 2023-10-07 | End: 2023-10-06 | Stop reason: HOSPADM

## 2023-10-06 RX ORDER — CEFAZOLIN SODIUM 2 G/100ML
2 INJECTION, SOLUTION INTRAVENOUS ONCE
Status: COMPLETED | OUTPATIENT
Start: 2023-10-06 | End: 2023-10-06

## 2023-10-06 RX ORDER — LABETALOL HYDROCHLORIDE 5 MG/ML
5 INJECTION, SOLUTION INTRAVENOUS ONCE AS NEEDED
Status: DISCONTINUED | OUTPATIENT
Start: 2023-10-06 | End: 2023-10-06

## 2023-10-06 RX ORDER — LIDOCAINE HYDROCHLORIDE 10 MG/ML
0.1 INJECTION, SOLUTION EPIDURAL; INFILTRATION; INTRACAUDAL; PERINEURAL ONCE
Status: DISCONTINUED | OUTPATIENT
Start: 2023-10-06 | End: 2023-10-06

## 2023-10-06 RX ORDER — OXYCODONE HYDROCHLORIDE 5 MG/1
10 TABLET ORAL EVERY 4 HOURS PRN
Status: DISCONTINUED | OUTPATIENT
Start: 2023-10-06 | End: 2023-10-06 | Stop reason: HOSPADM

## 2023-10-06 RX ORDER — CELECOXIB 200 MG/1
200 CAPSULE ORAL ONCE
Status: COMPLETED | OUTPATIENT
Start: 2023-10-06 | End: 2023-10-06

## 2023-10-06 RX ORDER — ONDANSETRON HYDROCHLORIDE 2 MG/ML
4 INJECTION, SOLUTION INTRAVENOUS ONCE AS NEEDED
Status: DISCONTINUED | OUTPATIENT
Start: 2023-10-06 | End: 2023-10-06

## 2023-10-06 RX ORDER — ONDANSETRON HYDROCHLORIDE 2 MG/ML
4 INJECTION, SOLUTION INTRAVENOUS ONCE AS NEEDED
Status: DISCONTINUED | OUTPATIENT
Start: 2023-10-06 | End: 2023-10-06 | Stop reason: HOSPADM

## 2023-10-06 RX ORDER — METOPROLOL TARTRATE 1 MG/ML
INJECTION, SOLUTION INTRAVENOUS AS NEEDED
Status: DISCONTINUED | OUTPATIENT
Start: 2023-10-06 | End: 2023-10-06

## 2023-10-06 RX ORDER — MIDAZOLAM HYDROCHLORIDE 1 MG/ML
INJECTION, SOLUTION INTRAMUSCULAR; INTRAVENOUS AS NEEDED
Status: DISCONTINUED | OUTPATIENT
Start: 2023-10-06 | End: 2023-10-06

## 2023-10-06 RX ORDER — TRANEXAMIC ACID 100 MG/ML
INJECTION, SOLUTION INTRAVENOUS AS NEEDED
Status: DISCONTINUED | OUTPATIENT
Start: 2023-10-06 | End: 2023-10-06

## 2023-10-06 RX ORDER — ASPIRIN 81 MG/1
81 TABLET ORAL 2 TIMES DAILY
Status: DISCONTINUED | OUTPATIENT
Start: 2023-10-06 | End: 2023-10-06 | Stop reason: HOSPADM

## 2023-10-06 RX ADMIN — PROPOFOL 40 MG: 10 INJECTION, EMULSION INTRAVENOUS at 07:29

## 2023-10-06 RX ADMIN — ONDANSETRON 4 MG: 2 INJECTION INTRAMUSCULAR; INTRAVENOUS at 09:42

## 2023-10-06 RX ADMIN — LABETALOL HYDROCHLORIDE 5 MG: 5 INJECTION INTRAVENOUS at 08:36

## 2023-10-06 RX ADMIN — MIDAZOLAM 2 MG: 1 INJECTION INTRAMUSCULAR; INTRAVENOUS at 07:16

## 2023-10-06 RX ADMIN — LABETALOL HYDROCHLORIDE 10 MG: 5 INJECTION INTRAVENOUS at 09:17

## 2023-10-06 RX ADMIN — TRANEXAMIC ACID 1000 MG: 1 INJECTION, SOLUTION INTRAVENOUS at 07:35

## 2023-10-06 RX ADMIN — METOPROLOL TARTRATE 3 MG: 5 INJECTION INTRAVENOUS at 08:49

## 2023-10-06 RX ADMIN — ACETAMINOPHEN 650 MG: 325 TABLET ORAL at 06:10

## 2023-10-06 RX ADMIN — SODIUM CHLORIDE, POTASSIUM CHLORIDE, SODIUM LACTATE AND CALCIUM CHLORIDE: 600; 310; 30; 20 INJECTION, SOLUTION INTRAVENOUS at 06:45

## 2023-10-06 RX ADMIN — FENTANYL CITRATE 25 MCG: 0.05 INJECTION, SOLUTION INTRAMUSCULAR; INTRAVENOUS at 07:47

## 2023-10-06 RX ADMIN — SODIUM CHLORIDE, POTASSIUM CHLORIDE, SODIUM LACTATE AND CALCIUM CHLORIDE 100 ML/HR: 600; 310; 30; 20 INJECTION, SOLUTION INTRAVENOUS at 07:00

## 2023-10-06 RX ADMIN — PROPOFOL 60 MCG/KG/MIN: 10 INJECTION, EMULSION INTRAVENOUS at 07:29

## 2023-10-06 RX ADMIN — MIDAZOLAM 2 MG: 1 INJECTION INTRAMUSCULAR; INTRAVENOUS at 06:58

## 2023-10-06 RX ADMIN — OXYCODONE 5 MG: 5 TABLET ORAL at 12:53

## 2023-10-06 RX ADMIN — SODIUM CHLORIDE, POTASSIUM CHLORIDE, SODIUM LACTATE AND CALCIUM CHLORIDE: 600; 310; 30; 20 INJECTION, SOLUTION INTRAVENOUS at 08:16

## 2023-10-06 RX ADMIN — PROPOFOL 20 MG: 10 INJECTION, EMULSION INTRAVENOUS at 07:44

## 2023-10-06 RX ADMIN — CEFAZOLIN SODIUM 3 G: 2 INJECTION, SOLUTION INTRAVENOUS at 07:25

## 2023-10-06 RX ADMIN — FENTANYL CITRATE 25 MCG: 0.05 INJECTION, SOLUTION INTRAMUSCULAR; INTRAVENOUS at 09:41

## 2023-10-06 RX ADMIN — PROPOFOL 20 MG: 10 INJECTION, EMULSION INTRAVENOUS at 07:40

## 2023-10-06 RX ADMIN — KETOROLAC TROMETHAMINE 15 MG: 15 INJECTION, SOLUTION INTRAMUSCULAR; INTRAVENOUS at 12:55

## 2023-10-06 RX ADMIN — LABETALOL HYDROCHLORIDE 5 MG: 5 INJECTION INTRAVENOUS at 09:51

## 2023-10-06 RX ADMIN — DEXAMETHASONE SODIUM PHOSPHATE 8 MG: 4 INJECTION, SOLUTION INTRAMUSCULAR; INTRAVENOUS at 09:42

## 2023-10-06 RX ADMIN — CELECOXIB 200 MG: 200 CAPSULE ORAL at 06:10

## 2023-10-06 RX ADMIN — Medication 2 L/MIN: at 11:45

## 2023-10-06 RX ADMIN — ACETAMINOPHEN 975 MG: 325 TABLET ORAL at 14:30

## 2023-10-06 RX ADMIN — FENTANYL CITRATE 50 MCG: 50 INJECTION INTRAMUSCULAR; INTRAVENOUS at 06:58

## 2023-10-06 RX ADMIN — POVIDONE-IODINE: 5 SOLUTION TOPICAL at 06:05

## 2023-10-06 RX ADMIN — LABETALOL HYDROCHLORIDE 5 MG: 5 INJECTION INTRAVENOUS at 08:23

## 2023-10-06 SDOH — SOCIAL STABILITY: SOCIAL INSECURITY: FEELS SAFE LIVING IN HOME: YES

## 2023-10-06 SDOH — HEALTH STABILITY: MENTAL HEALTH: BEHAVIOR: ORIENTED

## 2023-10-06 SDOH — HEALTH STABILITY: MENTAL HEALTH: CURRENT SMOKER: 0

## 2023-10-06 SDOH — HEALTH STABILITY: MENTAL HEALTH

## 2023-10-06 ASSESSMENT — PAIN SCALES - GENERAL
PAINLEVEL_OUTOF10: 7
PAIN_LEVEL: 0
PAINLEVEL_OUTOF10: 0 - NO PAIN
PAINLEVEL_OUTOF10: 0 - NO PAIN
PAINLEVEL_OUTOF10: 2
PAINLEVEL_OUTOF10: 0 - NO PAIN
PAINLEVEL_OUTOF10: 2
PAINLEVEL_OUTOF10: 5 - MODERATE PAIN
PAINLEVEL_OUTOF10: 6
PAINLEVEL_OUTOF10: 0 - NO PAIN
PAINLEVEL_OUTOF10: 6
PAINLEVEL_OUTOF10: 0 - NO PAIN
PAINLEVEL_OUTOF10: 5 - MODERATE PAIN
PAINLEVEL_OUTOF10: 5 - MODERATE PAIN
PAINLEVEL_OUTOF10: 0 - NO PAIN
PAINLEVEL_OUTOF10: 0 - NO PAIN
PAINLEVEL_OUTOF10: 4

## 2023-10-06 ASSESSMENT — PAIN DESCRIPTION - DESCRIPTORS: DESCRIPTORS: CRAMPING

## 2023-10-06 ASSESSMENT — COLUMBIA-SUICIDE SEVERITY RATING SCALE - C-SSRS
1. IN THE PAST MONTH, HAVE YOU WISHED YOU WERE DEAD OR WISHED YOU COULD GO TO SLEEP AND NOT WAKE UP?: NO
2. HAVE YOU ACTUALLY HAD ANY THOUGHTS OF KILLING YOURSELF?: NO
6. HAVE YOU EVER DONE ANYTHING, STARTED TO DO ANYTHING, OR PREPARED TO DO ANYTHING TO END YOUR LIFE?: NO

## 2023-10-06 ASSESSMENT — PAIN - FUNCTIONAL ASSESSMENT
PAIN_FUNCTIONAL_ASSESSMENT: 0-10
PAIN_FUNCTIONAL_ASSESSMENT: WONG-BAKER FACES
PAIN_FUNCTIONAL_ASSESSMENT: WONG-BAKER FACES
PAIN_FUNCTIONAL_ASSESSMENT: 0-10

## 2023-10-06 ASSESSMENT — COGNITIVE AND FUNCTIONAL STATUS - GENERAL
MOBILITY SCORE: 23
MOBILITY SCORE: 20
CLIMB 3 TO 5 STEPS WITH RAILING: A LITTLE
CLIMB 3 TO 5 STEPS WITH RAILING: A LITTLE
STANDING UP FROM CHAIR USING ARMS: A LITTLE
MOVING TO AND FROM BED TO CHAIR: A LITTLE
WALKING IN HOSPITAL ROOM: A LITTLE

## 2023-10-06 ASSESSMENT — ACTIVITIES OF DAILY LIVING (ADL)
BATHING: NO ASSISTANCE
ADL_ASSISTANCE: INDEPENDENT

## 2023-10-06 NOTE — DISCHARGE SUMMARY
Discharge Diagnosis  Right Total Knee Arthroplasty    Issues Requiring Follow-Up  Home care services to start within 48 hours. Outpatient PT to start per surgeon instructions.    Test Results Pending At Discharge  Pending Labs       No current pending labs.            Hospital Course  Patient underwent Right Total Knee Arthroplasty the patient was then takent o the PACU in stabe condition. Patient was then transferred to the [facility].  Pain was appropriately controlled. Diet was advanced as tolerated. PT/OT was consulted and recommended home for patient on discharge. Patient had uneventful hospital course. Patient is to follow-up in 6 weeks at scheduled post-op visit.    Face to Face following surgical procedure on 10/06/23  The patient was awake and alert. VSS, afebrile. Sensation intact bilaterally, sural/saph/sp/tibal n. Motor intact flexion/extension/DF/PF/EHL/FHL bilaterally. Palpable symmetric DP/PT pulse bilaterally.    Pertinent Physical Exam At Time of Discharge  Physical Exam  Vitals and nursing note reviewed. Exam conducted with a chaperone present.   Constitutional:       Appearance: Normal appearance.   HENT:      Head: Normocephalic and atraumatic.      Nose: Nose normal.      Mouth/Throat:      Mouth: Mucous membranes are moist.      Pharynx: Oropharynx is clear.   Eyes:      Extraocular Movements: Extraocular movements intact.      Conjunctiva/sclera: Conjunctivae normal.      Pupils: Pupils are equal, round, and reactive to light.   Cardiovascular:      Rate and Rhythm: Normal rate and regular rhythm.      Pulses: Normal pulses.      Heart sounds: Normal heart sounds.   Pulmonary:      Effort: Pulmonary effort is normal.      Breath sounds: Normal breath sounds.   Abdominal:      General: Bowel sounds are normal.      Palpations: Abdomen is soft.   Musculoskeletal:      Cervical back: Normal range of motion.   Skin:     General: Skin is warm and dry.      Capillary Refill: Capillary refill takes less  than 2 seconds.   Neurological:      General: No focal deficit present.      Mental Status: She is alert and oriented to person, place, and time. Mental status is at baseline.   Psychiatric:         Mood and Affect: Mood normal.         Thought Content: Thought content normal.         Judgment: Judgment normal.         Home Medications  Scheduled medications    Current Facility-Administered Medications:     acetaminophen (Tylenol) tablet 975 mg, 975 mg, oral, TID, Андрей Negrete MD    aspirin EC tablet 81 mg, 81 mg, oral, BID, Андрей Negrete MD    benzocaine-menthol (Cepastat Sore Throat) 15-3.6 mg lozenge 1 lozenge, 1 lozenge, Mouth/Throat, q4h PRN, Андрей Negrete MD    ceFAZolin in dextrose (iso-os) (Ancef) IVPB 2 g, 2 g, intravenous, q8h, Андрей Negrete MD    dextrose 10 % in water (D10W) infusion, 0.3 g/kg/hr, intravenous, Once PRN, Андрей Negrete MD    dextrose 50 % injection 25 g, 25 g, intravenous, q15 min PRN, Андрей Negrete MD    diphenhydrAMINE (Sominex) tablet 12.5 mg, 12.5 mg, oral, q6h PRN, Андрей Negrete MD    docusate sodium (Colace) capsule 100 mg, 100 mg, oral, BID, Андрей Negrete MD    ferrous sulfate 325 (65 Fe) MG tablet 65 mg of iron, 65 mg of iron, oral, BID with meals, Андрей Negrete MD    glucagon (Glucagen) injection 1 mg, 1 mg, intramuscular, q15 min PRN, Андрей Negrete MD    HYDROmorphone (Dilaudid) injection 0.2 mg, 0.2 mg, intravenous, q2h PRN, Андрей Negrete MD    insulin regular (HumuLIN R) injection 0-5 Units, 0-5 Units, subcutaneous, TID with meals, Андрей Negrete MD    ketorolac (Toradol) injection 15 mg, 15 mg, intravenous, q6h, Андрей Negrete MD, 15 mg at 10/06/23 1255    lactated Ringer's infusion, 100 mL/hr, intravenous, Continuous, Андрей Negrete MD    naloxone (Narcan) injection 0.2 mg, 0.2 mg, intravenous, q5 min PRN, Андрей Negrete MD    naloxone (Narcan) injection 0.2 mg, 0.2 mg, intravenous, q5 min  PRN, Андрей Negrete MD    naloxone (Narcan) injection 0.2 mg, 0.2 mg, intravenous, q5 min PRN, Андрей Negrete MD    ondansetron ODT (Zofran-ODT) disintegrating tablet 4 mg, 4 mg, oral, q8h PRN **OR** ondansetron (Zofran) injection 4 mg, 4 mg, intravenous, q8h PRN, Андрей Negrete MD    oxyCODONE (Roxicodone) immediate release tablet 10 mg, 10 mg, oral, q4h PRN, Андрей Negrete MD    oxyCODONE (Roxicodone) immediate release tablet 5 mg, 5 mg, oral, q6h PRN, Андрей Negrete MD, 5 mg at 10/06/23 1253    oxygen (O2) therapy, 2 L/min, inhalation, Continuous, Андрей Negrete MD, 2 L/min at 10/06/23 1145    [START ON 10/7/2023] pantoprazole (ProtoNix) EC tablet 40 mg, 40 mg, oral, Daily before breakfast, Андрей Negrete MD    polyethylene glycol (Glycolax, Miralax) packet 17 g, 17 g, oral, Daily, Андрей Negrete MD    traMADol (Ultram) tablet 50 mg, 50 mg, oral, q6h PRN, Андрей Negrete MD     PRN medications  PRN medications: benzocaine-menthol, dextrose 10 % in water (D10W), dextrose, diphenhydrAMINE, glucagon, HYDROmorphone, naloxone, naloxone, naloxone, ondansetron ODT **OR** ondansetron, oxyCODONE, oxyCODONE, traMADol    Discharge medications     Your medication list        START taking these medications        Instructions Last Dose Given Next Dose Due   aspirin 81 mg chewable tablet      Chew and swallow 1 tablet (81 mg) by mouth 2 times a day.       cefadroxil 500 mg capsule  Commonly known as: Duricef      Take 1 capsule (500 mg) by mouth 2 times a day for 5 days.       docusate sodium 100 mg capsule  Commonly known as: Colace      Take 1 capsule (100 mg) by mouth 2 times a day.       ondansetron 4 mg tablet  Commonly known as: Zofran      Take 1 tablet (4 mg) by mouth every 8 hours if needed for nausea or vomiting.       oxyCODONE 5 mg immediate release tablet  Commonly known as: Roxicodone      Take 1-2 tablets (5-10 mg) by mouth every 6 hours if needed for severe pain (7 -  10) for up to 7 days.       senna 8.6 mg tablet  Generic drug: sennosides      Take 1 tablet (8.6 mg) by mouth once daily.       traMADol 50 mg tablet  Commonly known as: Ultram      Take 1-2 tablets ( mg) by mouth every 6 hours if needed for severe pain (7 - 10) for up to 7 days.              CONTINUE taking these medications        Instructions Last Dose Given Next Dose Due   atorvastatin 10 mg tablet  Commonly known as: Lipitor           dilTIAZem  mg 24 hr capsule  Commonly known as: Cardizem CD           gabapentin 400 mg capsule  Commonly known as: Neurontin           glipiZIDE XL 5 mg 24 hr tablet  Commonly known as: Glucotrol XL           ibuprofen 600 mg tablet           metFORMIN (OSM) 1,000 mg 24 hr tablet  Commonly known as: Fortamet           Trulicity 1.5 mg/0.5 mL pen injector injection  Generic drug: dulaglutide                     Where to Get Your Medications        These medications were sent to [facility]  [address] , [address], [facility] [address]      Hours: 8 AM to 6 PM Mon-Fri, 9 AM to 1 PM Saturday Phone: [phone]   aspirin 81 mg chewable tablet  cefadroxil 500 mg capsule  docusate sodium 100 mg capsule  ondansetron 4 mg tablet  oxyCODONE 5 mg immediate release tablet  senna 8.6 mg tablet  traMADol 50 mg tablet         You have not been prescribed any medications.       Outpatient Follow-Up  Patient to follow-up with /Greer Lai PA-C in 6 weeks.    Special Instructions    Please read discharge instructions provided by your surgeon before calling with questions as this will delay care.    Medication refills-Oxycodone and Tramadol will be refilled every 7 days per state law. [facility]. All medication requests may take up to 72 hours to refill and refills after Friday 1pm will be refilled on the next business day.

## 2023-10-06 NOTE — PERIOPERATIVE NURSING NOTE
Patient voided approx 400mL urine per PT.  Pt dressed and back in chair awaiting wife to come for him.  Still no answer at his or her cell phone numbers.

## 2023-10-06 NOTE — ANESTHESIA PROCEDURE NOTES
Spinal Block    Patient location during procedure: OR  Start time: 10/6/2023 7:16 AM  End time: 10/6/2023 7:21 AM  Reason for block: primary anesthetic  Staffing  Performed: JORGE   Authorized by: Marielos Martins MD MPH    Performed by: JORGE Blake    Preanesthetic Checklist  Completed: patient identified, IV checked, risks and benefits discussed, surgical consent, monitors and equipment checked, pre-op evaluation, timeout performed and sterile techniques followed  Block Timeout  RN/Licensed healthcare professional reads aloud to the Anesthesia provider and entire team: Patient identity, procedure with side and site, patient position, and as applicable the availability of implants/special equipment/special requirements.    Timeout performed at:   Spinal Block  Patient position: sitting  Prep: Betadine  Sterility prep: cap, drape, gloves and hand hygiene  Sedation level: light sedation  Patient monitoring: blood pressure, continuous pulse oximetry and heart rate  Approach: midline  Vertebral space: L3-4  Injection technique: single-shot  Needle  Needle type: pencil-point   Needle gauge: 25 G  Needle length: 10 cm  Free flowing CSF: yes    Assessment bilateral  Procedure assessment: patient tolerated procedure well with no immediate complications  Additional Notes  Spinal kit: exp: 11-30-25 lot# [id number]  1.6ml 0.75% marcaine in dextrose injected

## 2023-10-06 NOTE — PERIOPERATIVE NURSING NOTE
Pt to rapid recovery room #209.  Pt voices no c/o pain and appears in no distress.  IVF continue without infiltration.  Pt with good effort for L foot and ankle press and pushes but with right weak.  Pt encouraged to order lunch.  Pt educated cannot get up without staff assistance at anytime throughout stay.

## 2023-10-06 NOTE — H&P
"History Of Present Illness  Migue Adair is a 58 y.o. male presenting with right knee pain secondary to end stage osteoarthritis. He has failed conservative treatment and was indicated for R TKA. No changes to overall health.      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    Surgical History  He has a past surgical history that includes Colonoscopy and Lipoma resection.     Social History  He reports that he quit smoking about 3 years ago. His smoking use included cigarettes. He has a 10.00 pack-year smoking history. He has never used smokeless tobacco. He reports current alcohol use of about 1.0 - 2.0 standard drink of alcohol per week. He reports that he does not use drugs.    Family History  Family History   Problem Relation Name Age of Onset    Diabetes Mother      Lung cancer Father          Allergies  Patient has no known allergies.    Review of Systems     Physical Exam  R Knee: Skin intact   Valgus deformity  NVID  2+ DP      Last Recorded Vitals  Blood pressure 146/87, pulse 74, temperature 36.8 °C (98.2 °F), temperature source Tympanic, resp. rate 20, height 1.88 m (6' 2\"), weight 120 kg (263 lb 7.2 oz), SpO2 97 %.    Relevant Results      Scheduled medications  albuterol, 2.5 mg, nebulization, Once  ceFAZolin, 2 g, intravenous, Once  fentaNYL, 50 mcg, intravenous, Once  ipratropium, 500 mcg, nebulization, Once  lidocaine PF, 0.1 mL, subcutaneous, Once  midazolam, 2 mg, intravenous, Once      Continuous medications  lactated Ringer's, 100 mL/hr  lactated Ringer's, 100 mL/hr      PRN medications  PRN medications: albuterol, HYDROmorphone, HYDROmorphone, labetaloL, meperidine, ondansetron, promethazine (Phenergan) 6.25 mg in sodium chloride 0.9% 50 mL IV  No results " found for this or any previous visit (from the past 24 hour(s)).    Assessment/Plan   Principal Problem:    Primary osteoarthritis of right knee  Active Problems:    HTN (hypertension)    Diabetes mellitus, type 2 (CMS/HCC)    Obesity    TORSTEN (obstructive sleep apnea)    Peripheral neuropathy      Proceed with R TKA. Risks, benefits and alternatives reviewed         Андрей Negrete MD

## 2023-10-06 NOTE — PERIOPERATIVE NURSING NOTE
RT at bedside.  Unable to locate patient's wife in the lobby or via phone.  Left one message then am receiving her mailbox is full.  Left a message on the home phone for her to return to the hospital.

## 2023-10-06 NOTE — POST-PROCEDURE NOTE
Patient wife at bedside. Discharge instructions given to wife/patient.   Both state understanding and have had all questions answered.

## 2023-10-06 NOTE — PERIOPERATIVE NURSING NOTE
Pt sitting in chair.  Denied need to void at this time.  PT to return to walk patient in the hallway and to the stairs.  Still awaiting wife to return.

## 2023-10-06 NOTE — PERIOPERATIVE NURSING NOTE
Report taken from Mar KUHN RN, for off going shift. Pt. Sitting up in recliner and appears to not be in distress. Pain is tolerable. Awaiting for his wife to arrive to go over discharge instructions.

## 2023-10-06 NOTE — ANESTHESIA PROCEDURE NOTES
Peripheral Block    Patient location during procedure: pre-op  Start time: 10/6/2023 7:04 AM  End time: 10/6/2023 7:05 AM  Reason for block: at surgeon's request and post-op pain management  Staffing  Performed: attending   Authorized by: Marielos Martisn MD MPH    Performed by: Marielos Martins MD MPH  Preanesthetic Checklist  Completed: patient identified, IV checked, site marked, risks and benefits discussed, surgical consent, monitors and equipment checked, pre-op evaluation and timeout performed   Timeout performed at: 10/6/2023 6:57 AM  Peripheral Block  Patient position: sitting  Prep: ChloraPrep  Patient monitoring: heart rate  Block type: other (I PACK)  Laterality: right  Injection technique: single-shot  Guidance: ultrasound guided  Local infiltration: ropivacaine  Infiltration strength: 0.5 %  Dose: 15 mL  Needle  Needle gauge: 21 G  Needle length: 10 cm  Needle localization: ultrasound guidance     image stored in chart  Assessment  Injection assessment: negative aspiration for heme, no paresthesia on injection, incremental injection and local visualized surrounding nerve on ultrasound  Slow fractionated injection: yes

## 2023-10-06 NOTE — PERIOPERATIVE NURSING NOTE
saw patient when first arrived.  IVF continue #3 LR bag into intact 20G IV site.  Message left for wife to come back for patient and liaison called to assure wife was not in lobby.

## 2023-10-06 NOTE — PERIOPERATIVE NURSING NOTE
Patient admitted to pacu. Drowsy, but arousable. No iceman, icepack applied to R knee. R knee with ace bandage, CDI.

## 2023-10-06 NOTE — ANESTHESIA PROCEDURE NOTES
Peripheral Block    Patient location during procedure: pre-op  Start time: 10/6/2023 7:01 AM  End time: 10/6/2023 7:03 AM  Reason for block: at surgeon's request and post-op pain management  Staffing  Performed: attending   Authorized by: Marielos Martins MD MPH    Performed by: Marielos Martins MD MPH  Preanesthetic Checklist  Completed: patient identified, IV checked, site marked, risks and benefits discussed, surgical consent, monitors and equipment checked, pre-op evaluation and timeout performed   Timeout performed at: 10/6/2023 6:57 AM  Peripheral Block  Patient position: sitting  Prep: ChloraPrep  Patient monitoring: heart rate  Block type: adductor canal  Laterality: right  Injection technique: single-shot  Guidance: ultrasound guided  Local infiltration: ropivacaine  Infiltration strength: 0.5 %  Dose: 15 mL  Needle  Needle gauge: 21 G  Needle length: 10 cm  Needle localization: ultrasound guidance     image stored in chart  Assessment  Injection assessment: negative aspiration for heme, no paresthesia on injection, incremental injection and local visualized surrounding nerve on ultrasound  Slow fractionated injection: yes

## 2023-10-06 NOTE — DISCHARGE INSTRUCTIONS
Андрей Negrete MD MS  [address]   [address]  [phone] (office)  [phone] (fax)    PLEASE READ CAREFULLY BEFORE CONTACTING YOUR PROVIDER.    WE WORK COLLABORATIVELY AS A TEAM. CALLING MULTIPLE STAFF MEMBERS REGARDING THE SAME ISSUE WILL DELAY YOUR CARE.  [employer] IS THE PREFERRED COMMUNICATION FOR ALL TEAM MEMBERS.    Postoperative Instructions: TOTAL KNEE ARTHROPLASTY    JOINT CARE TEAM  Please use the information below to contact your care team following surgery.  If you are leaving a message, please include your full name, date of birth and date of surgery so that we can correctly identify you.  Your call will be returned within 1-2 business days, please do not leave multiple messages regarding a single issue while you are awaiting a return call.     Who to call Contact Information Matters needing handled   Андрей Negrete MD [facility]  [phone] Prescription Refills   Orders for dental antibiotics lifelong     Ester Teixeira MBA, BSN, RN-BC  Ortho Coordinator Cecelia Meade, RN, BSN  Ortho Coordinator Beatriz Medina BSN-BC  Ortho Nurse Navigator   [phone] Nursing, medical question related questions or concerns within 6 weeks of surgery   Orders for Outpatient Physical Therapy                    [phone]     Scheduling office Visits  Medical questions/concerns  Leave of Absence or other paperwork  Any concerns more than 6 weeks from surgery - an appointment will need to be made     MEDICATION REFILLS - (MyChart or Main Office)    You will not receive a call indicating that your prescription has been filled.  Please contact your pharmacy with any questions.    Medication refills will be filled Monday-Friday 7am to 1pm ONLY. Please call the office or send a [facility] message for a refill request.  Any requests received outside of this timeframe will be handled on the next business day.  The office staff and  orthopedic nurses cannot refill medications; messages should be left directly through the office or via my chart.  Please do not call multiple times or call other members of the care team for medication needs, this will cause the refill to take longer.    Per State and Institutional policy, pain medications can only be refilled every 7 days for up to six weeks following surgery.    DRIVING & TRAVEL AFTER SURGERY   Patients should anticipate waiting at least 4-6 weeks before traveling long distances after surgery.  You will need to stop to walk around ever 1 hour during your travel to help with blood clot prevention.  Please call the office or your joint nurse to discuss prior to post-surgical travel.  Patients may not drive until cleared by the joint nurse or the office.    DENTAL PROCEDURES & CLEANINGS  You must wait a minimum of 3 months for elective dental appointments, including routine cleanings or dental work including bridges, crowns, extractions, etc..  For any dental visit - cleaning or dental procedures - patients must take an antibiotic 1 hour before the appointment.  Antibiotics are a lifelong need before dental appointments.  The antibiotic prescribed will be based on each patient's allergies.    WOUND CARE  Pain and swelling are normal following surgery and can last for weeks to months depending on the patient.  To help relieve these symptoms, please follow the post-operative pain regimen as it has been prescribed, use ice often, wear compression stockings every day as prescribed, and elevate your leg every hour.   Leaving the hospital, you have an ACE wrap in place. Two days after surgery, you can remove the ACE wrap and discard it. It does NOT need to be reapplied again.  You have a waterproof bandage on your wound and may shower with this on. The waterproof bandage is to remain in place for a 7 days. You or your home therapist should remove it at this time. You may leave your incision open to air  after the bandage has been removed.  You may shower 48 hours after surgery.  Do not scrub directly over or near the edges of your surgical bandage.  Soap and water may run freely over the site.  Under your waterproof bandage you have Steri-strips or a mesh covering in place. DO NOT peel them off. They will fall off on their own. You can continue to shower with these on. Let the water run freely over your incision when showering and do not scrub the incision or the surrounding area.   When drying the area around the incision, please use a SEPARATE towel that was not used on the rest of your body. The towel should be recently laundered but does not have to be cleaned a specific way. It should be laundered every 3 days. Pat the area dry. Do not rub the area around the incision. Steri strips will fall off in 1-2 weeks. If after 2 weeks they, have not, gently peel them off.  You may have clear stitches at the ends of your wound. Place Band-Aids on them for the first few weeks to prevent rubbing. You can gently tug on them after 2 weeks and they will come out or fall out on their own. DO NOT cut them. If they have not fallen out by you post-op visit, your doctor will remove them  If you have black stitches in place, your doctor will remove them in 2 weeks. These CANNOT get wet. If you have the silver waterproof bandage in place, you can shower. If the waterproof bandage is removed or not sticking, you CANNOT shower.  DO NOT soak your incision in a bath, hot tub, pool or pond/lake for a minimum of 12 weeks following your surgery.  DO NOT use lotions, creams, ointments on your wound for a minimum of 6 weeks following your surgery. At that time you may use vitamin E to assist with softening of your incision. Your physical therapist or doctor will talk to you about scar massage which can be started at 6 weeks.   You do NOT need to use the soap that was provided to you prior to surgery after surgery. Use regular soap or  shampoo.     PAIN, SWELLING, BRUISING & CLICKING  Pain and swelling are a natural part of your recovery which is considered normal fur up to a year after surgery.  Symptoms may be treated with movement, ice, compression stockings, elevating your leg, and by following the pain medication regimen as prescribed.  Bruising is normal for several weeks after surgery and will run down the leg over time.  You may ice areas that are tender to help with discomfort.  You are required to wear the provided compression stockings, every day, for 4 weeks following surgery.  Remove the stockings at night and place them back on in the morning.  Pain and swelling may temporarily increase with an increase in activity or exercise.  Use ice after activity.  Audible clicking with movement or exercises is considered normal following joint replacement.  You may also feel decreased sensation or numbness near the incision site.  These are usually normal and may or may not fade over time.     PERSONAL HYGIENE  You may shower upon discharging from the hospital.  Soap and water is permitted to run over the surgical dressing, steri-strips and incision.  Do not scrub directly over these items.  DO NOT soak your incision in a bath, hot tub, pool or pond/lake for a minimum of 12 weeks following your surgery.  DO NOT use lotions, creams, ointments on your wound for a minimum of 6 weeks following your surgery. At that time you may use vitamin E to assist with softening of your incision.      RESTARTING HOME ROUTINE - DIET & MEDICATIONS  Post-operative constipation can result due to a combination of inactivity, anesthesia and pain medication. To help prevent this, you should increase your water and fiber intake. Physical activity such as walking will also help stimulate the bowels.   You may resume your normal diet when you discharge home.  Choose foods that help promote good bowel habits and prevent constipation, such as foods high in fiber.  You may  restart your home medications the following day after your surgery UNLESS you have been given alternate instructions.  Follow the instructions given to you on your hospital discharge instructions for more information regarding your home medications.      IN-HOME PHYSICAL THERAPY & OUTPATIENT PHYSICAL THERAPY  In-home physical therapy will start 1-2 days after you get home from the hospital.    The home care agency will call within the first 24-48 hours to set up their first visit.  Please do not call your care team to inquire during this timeframe.  Continue the exercises you were given in the hospital until you have been seen by in-home therapy.  Make sure to provide a phone number with the ability for the home care staff to leave a message if you do not answer your phone.    Outpatient physical therapy following knee replacement surgery should begin 2-3 weeks after surgery.  You should call to schedule this appointment ASAP if not already scheduled before surgery.  Waiting until you are ready for outpatient physical therapy will cause a delay in your care.  You may choose any outpatient physical therapy location.  Call the office for an order if needed.    EMERGENCIES - WHEN TO CONTACT THE SURGEON'S OFFICE IMMEDIATELY  Fever >101 with chills that has been present for at least 48 hours.   Excessive bleeding from incision that will not slow down. A small amount of drainage is normal and expected.  Once pressure is applied and the area is covered, do not continue to check the area regularly.  This will remove pressure and bleeding will continue.  Leave in place for 4-6 hours.  Signs of infection of incision-excessive drainage that is soaking through your dressing (especially if it is pus-like), redness that is spreading out from the edges of your incision, or increased warmth around the area.  Excruciating pain for which the pain medication, taken as instructed, is not helping.  Severe calf pain.  Go directly to the  emergency room or call 911, if you are experiencing chest pain or difficulty breathing.  NEVER place a pillow or blanket under your knee. When you are in bed or sitting on a couch, keep your knee COMPLETELY STRAIGHT. This is best done by placing a pillow or blanket under your calf or heel to lift your knee off of the mattress or couch.         ICE & COLD THERAPY INSTRUCTIONS    To assist with pain control and post-op swelling, you should be using ice regularly throughout recovery, especially for the first 6 weeks, regardless of the cold therapy method you use.      Always make sure there is a layer of protection between the cold pad and your skin.    If you are using ICE PACKS or GEL PACKS, you will need to alternate 20 minutes on, 20 minutes off twice per hour.    If you are using an ICE MACHINE, please follow the provided ice machine instructions.  These devices differ from ice or ice packs whereas the mechanism circulates water through tubing and a pad to provide longer periods of cold therapy to the desired site.  You can use your cold devices around the clock for optimal comfort.  We recommend using cold therapy after working with therapy or completing exercises on your own.  There is no set schedule in which you must follow while using cold therapy.  Below are a few points to remember when using a cold therapy device:    You do not need to need to use the 20 on, 20 off method.  Detach the pad from the cooler and ambulate at least once every hour.  You can check your skin under the pad at this time.  You may wear the cold therapy device during periods of sleep including overnight.  If you wake up during the night, you can check the skin at this time.  You do not need to wake up specifically to perform skin checks.  Empty the cooler and pad when device is not in use.  Follow 's instructions for cleaning your cold therapy device.      DISCHARGE MEDICATIONS - Please reference the sample schedule for  instructions on how to best schedule medications.  PAIN MEDICATION    ___X_ Tramadol / Oxycodone  Tramadol and Oxycodone have been prescribed for post-operative pain control.    These medications will only be refilled ONCE every 7 days for a period of up to 6 weeks following surgery.  After 6 weeks, you will transition to acetaminophen and over -the- counter anti-inflammatories such as Ibuprofen, Advil or Aleve in conjunction with ICE/COLD THERAPY.   Side effects may be constipation and nausea, vomiting, sleepiness, dizziness, lightheadedness, headache, blurred vision, dry mouth sweating, itching (if you have itching, over-the -counter Benadryl can be used as needed).  You may NOT operate a motor vehicle while taking these medications or have been cleared by your care team.     ___X_ Acetaminophen (Tylenol)  Acetaminophen has been prescribed as an adjunct for pain control. Take two 500 mg tablets every 6 hours for 4 weeks. You will not receive a refill on this medication.  Do not exceed 4000mg of acetaminophen within a 24 hour period.  Side effects may include nausea, heartburn, drowsiness, and headache.    _______ Meloxicam (Mobic)-Meloxicam has been prescribed as an adjunct anti-inflammatory to assist in pain control.    Take one 15mg tablet once daily for 4 weeks.  You will not receive refills on this medication.   Side effects may include nausea.  May not be prescribed if you are on a more potent blood thinner than aspirin or have chronic kidney disease.    BLOOD THINNER    ___X_ Blood Thinner   Aspirin or another medication has been prescribed as a blood thinner to prevent blood clots in your leg or lungs. Take as prescribed on the bottle for 4 weeks. You will not receive a refill on this medication.  Do not take this medication if you are on another blood thinner.  Do not take any additional anti-inflammatory medications while taking Aspirin or another blood thinning medication.  Examples may include,  Meloxicam, Celebrex, Ibuprofen, Motrin, Aleve, or Advil.     ANTI NAUSEA    ___X_ Pantoprazole (Protonix)  Pantoprazole has been prescribed to help with nausea and protect your stomach while taking pain medication. Take one 40 mg tablet once daily for 4 weeks. You will not receive a refill on this medication.    ___X_ Zofran (Ondansetron)  Zofran has been prescribed to help with nausea and protect your stomach while taking pain medication. Take one 4 mg tablet every eight hours as needed for nausea. Refills will be provided as necessary.      STOOL SOFTENERS    ___X_ Colace (Docusate Sodium) and Senna (Sennoside)  Post-operative constipation can result due to a combination of inactivity, anesthesia and pain medication. To help prevent this, you should increase your water and fiber intake. Physical activity such as walking will also help stimulate the bowels.   Colace has been prescribed to help with constipation while on Oxycodone and Tramadol. Take one 100 mg tablet twice daily for 4 weeks. No refills needed.  Senna has been prescribed in combination with Colace to help with constipation while taking your pain medications.  Take one 8.6mg Tablet once daily.      You will not receive refills on the following medications:  Acetaminophen (Tylenol)  Miralax  Colace  Pantoprazole  Blood Thinner  Pain Medication Refills -[phone] or MyChart- Monday through Friday 7am-1pm    Medication refills will be sent upon receipt of your request during the times listed above. Due to the high call volume, you will not receive a call confirming prescription refills; please do not call multiple times.  Prescription refills may take a few hours to process, you may follow up with your pharmacy for pickup availability.    SAMPLE              The times below are an example of how to organize medications to optimize pain control  Your actual medication schedule may vary based on your last dose taken IN THE HOSPITAL      Time 3:00am  6:00am 9:00am 12:00pm 3:00pm 6:00pm 9:00pm 12:00am   Medications Tramadol Acetaminophen (Tylenol)   Oxycodone  Miralax   Blood Thinner  Colace  Pantoprazole  Tramadol Acetaminophen (Tylenol)   Oxycodone Tramadol Acetaminophen (Tylenol)   Oxycodone  Miralax Blood Thinner  Colace  Tramadol   Acetaminophen (Tylenol)   Oxycodone            You may begin to wean off the pain medication as your pain remains controlled with increased activity.  The schedules provided are meant to serve as an example.  You may wean off based on your pain control.  Please note that pain medications are not filled beyond 6 weeks after surgery.              The times below are an example of how to WEAN OFF medications WHILE CONTINUING TO OPTIMIZE PAIN CONTROL.  Your actual medication schedule may vary based on your last dose taken.  Time 12:00am 4:00am 8:00am 12:00pm 4:00pm 8:00pm   Med Tramadol Oxycodone   Tramadol Oxycodone Tramadol Oxycodone     Time 12:00am 6:00am 12:00pm 6:00pm   Med Tramadol Oxycodone   Tramadol Oxycodone     Time 12:00am 8:00am 4:00pm   Med Tramadol Oxycodone   Tramadol     Time 12:00am 12:00pm   Med Tramadol Tramadol

## 2023-10-06 NOTE — PROGRESS NOTES
Physical Therapy    Physical Therapy Evaluation & Treatment    Patient Name: Migue Adair  MRN: [id number]  Today's Date: 10/6/2023   Time Calculation  Start Time: 1244  Stop Time: 1322  Time Calculation (min): 38 min  Cleared by RN for second attempt and agreeable to session. Pt up in recliner at PT arrival.PT in again for second attempt from [id number].   Assessment/Plan   PT Assessment  PT Assessment Results: Decreased strength, Decreased mobility, Obesity, Pain  Rehab Prognosis: Good  Evaluation/Treatment Tolerance: Patient limited by fatigue, Patient limited by pain  Medical Staff Made Aware: No  Strengths: Coping skills, Premorbid level of function, Support of pets  Barriers to Participation: Housing layout  End of Session Communication: Bedside nurse  Assessment Comment: Pt tolerated session better than initial evaluation. pt with improvement in strength of RLE and sensation of RLE as wellas improved mobility with increased distance with CSx1.  End of Session Patient Position: Up in chair  IP OR SWING BED PT PLAN  Inpatient or Swing Bed: Inpatient  PT Plan  Treatment/Interventions: Transfer training, Gait training, Stair training, Strengthening, Range of motion, Therapeutic exercise  PT Plan: Skilled PT  PT Frequency: BID  PT Discharge Recommendations: Low intensity level of continued care  Equipment Recommended upon Discharge: Wheeled walker  PT Recommended Transfer Status: Stand by assist  PT - OK to Discharge: Yes      Subjective     General Visit Information:  General  Reason for Referral: R TKA  Referred By: Dr. Daniel Michelle  Past Medical History Relevant to Rehab: DM, neuropathy, gerd, HTN, TORSTEN, vocal cord granuloma, NVID, obesity  Family/Caregiver Present: No  General Comment: SECOND ATTEMPT FOR PT  Home Living:  Home Living  Type of Home: House  Lives With: Spouse  Home Adaptive Equipment: Cane, Walker rolling or standard  Home Layout: Two level  Home Access: Stairs to enter without rails  Entrance  Stairs-Rails: None  Entrance Stairs-Number of Steps: 4  Bathroom Shower/Tub: Tub/shower unit  Home Living Comments: does have 2nd level however does not use  Prior Level of Function:  Prior Function Per Pt/Caregiver Report  Level of [place]: Independent with ADLs and functional transfers  ADL Assistance: Independent  Homemaking Assistance: Independent  Ambulatory Assistance: Independent  Precautions:  Precautions  Post-Surgical Precautions: Right total knee precautions  Vital Signs:       Objective   Pain:  Pain Assessment  Pain Assessment: Ross-Baker FACES  Pain Score: 5 - Moderate pain  Pain Type: Surgical pain  Pain Location: Knee  Pain Orientation: Inner, Outer  Pain Interventions: Cold applied, Emotional support, Rest  Response to Interventions: improved levels of pain however continues to persist  Multiple Pain Sites: Two  Pain 2  Pain Score 2: 5 - Moderate pain  Pain Type 2: Acute pain  Pain Location 2: Buttocks  Pain Orientation 2: Inner, Left, Right  Pain Descriptors 2: Cramping  Pain Interventions 2: Repositioned, MD notified (Comment)  Response to Interventions 2: improved discomfort.  Cognition:  Cognition  Overall Cognitive Status: Within Functional Limits  Attention: Within Functional Limits    General Assessments:           Sensation  Light Touch: Partial deficits in the RLE  Sensation Comment: light touch limited on medial aspect of RLE and superior to the R knee    Coordination  Movements are Fluid and Coordinated: Yes    Static Sitting Balance  Static Sitting-Level of Assistance: Independent    Static Standing Balance  Static Standing-Level of Assistance: Independent  Functional Assessments:  Bed Mobility  Bed Mobility: Yes  Bed Mobility 1  Bed Mobility 1: Supine to sitting, Sitting to supine  Level of Assistance 1: Distant supervision  Bed Mobility Comments 1: pt transferred supine to sit<>sit to supine multiple times during session while in bed due to discomfort in glute region however able  to complete with DSx1.    Transfers  Transfer: Yes  Transfer 1  Technique 1: Sit to stand, Stand to sit  Transfer Device 1: Walker  Transfer Level of Assistance 1: Close supervision  Trials/Comments 1: pt completed multiple STS during session with CSx1 and use of RW wihtout buckle or LOB.    Ambulation/Gait Training  Ambulation/Gait Training Performed: Yes  Ambulation/Gait Training 1  Surface 1: Level tile  Device 1: Rolling walker  Assistance 1: Minimum assistance, Contact guard  Quality of Gait 1: Inconsistent stride length  Comments/Distance (ft) 1: Pt ambulated 15 feetx2 with RW at min A x1 and additional person with CGx1 for stabilization while first person stabilized R knee. Pt displaying slower cliff with step to pattern, mild buckle of R knee however whencued by PT for use of arm support, tolerated well. Pt fatigued quickly. pt without LOB but limited heel strike noted.    Stairs  Stairs: Yes  Stairs  Rails 1: Left  Curb Step 1: No  Device 1: Single point cane, Railing  Support Devices 1: Gait belt  Assistance 1: Close supervision  Comment/Number of Steps 1: Pt completed 3 steps with railing and cane at CSx1 with cueing from PT for safety and technique, used step to pattern and had no buckle or LOB.  Extremity/Trunk Assessments:  RLE   RLE : Exceptions to WFL  Strength RLE  R Knee Flexion: 4-/5  R Knee Extension: 3+/5  R Ankle Dorsiflexion: 5/5  R Ankle Plantar Flexion: 5/5  LLE   LLE : Within Functional Limits  Treatments:  Therapeutic Exercise  Therapeutic Exercise Performed: Yes    Therapeutic Activity  Therapeutic Activity Performed: Yes  Therapeutic Activity 1: Pt dressed BLE with some assist from PT and dressed BUE without assistance. Pt maintained balance during session without buckle or LOB and without holding onto walker. PT educated on keeping walker in front during activity.  Therapeutic Activity 2: Pt toileted without assistance as PT was providing supervision for balance however no buckle or  LOB. Pt completed hand hygiene without assistance.B ankle pumps, R quad sets, R gluteal sets, R heel slides, R SAQ, R hip abduction, and R SLR x 10 reps each.     Bed Mobility  Bed Mobility: Yes  Bed Mobility 1  Bed Mobility 1: Supine to sitting, Sitting to supine  Level of Assistance 1: Distant supervision  Bed Mobility Comments 1: pt transferred supine to sit<>sit to supine multiple times during session while in bed due to discomfort in glute region however able to complete with DSx1.    Ambulation/Gait Training  Ambulation/Gait Training Performed: Yes  Ambulation/Gait Training 1  Surface 1: Level tile  Device 1: Rolling walker  Assistance 1: Minimum assistance, Contact guard  Quality of Gait 1: Inconsistent stride length  Comments/Distance (ft) 1: Pt ambulated 15 feetx2 with RW at min A x1 and additional person with CGx1 for stabilization while first person stabilized R knee. Pt displaying slower cliff with step to pattern, mild buckle of R knee however whencued by PT for use of arm support, tolerated well. Pt fatigued quickly. pt without LOB but limited heel strike noted.  Transfers  Transfer: Yes  Transfer 1  Technique 1: Sit to stand, Stand to sit  Transfer Device 1: Walker  Transfer Level of Assistance 1: Close supervision  Trials/Comments 1: pt completed multiple STS during session with CSx1 and use of RW wihtout buckle or LOB.    Stairs  Stairs: Yes  Stairs  Rails 1: Left  Curb Step 1: No  Device 1: Single point cane, Railing  Support Devices 1: Gait belt  Assistance 1: Close supervision  Comment/Number of Steps 1: Pt completed 3 steps with railing and cane at CSx1 with cueing from PT for safety and technique, used step to pattern and had no buckle or LOB.  Outcome Measures:  [facility] Basic Mobility  Turning from your back to your side while in a flat bed without using bedrails: None  Moving from lying on your back to sitting on the side of a flat bed without using bedrails: None  Moving to and from bed to  chair (including a wheelchair): None  Standing up from a chair using your arms (e.g. wheelchair or bedside chair): None  To walk in hospital room: None  Climbing 3-5 steps with railing: A little  Basic Mobility - Total Score: 23        Education Documentation  Handouts, taught by Camelia Mahmood PT at 10/6/2023  1:40 PM.  Learner: Patient  Readiness: Acceptance  Method: Explanation, Handout, Demonstration  Response: Verbalizes Understanding, Needs Reinforcement    Precautions, taught by Camelia Mahmood PT at 10/6/2023  1:40 PM.  Learner: Patient  Readiness: Acceptance  Method: Explanation, Handout, Demonstration  Response: Verbalizes Understanding, Needs Reinforcement    Body Mechanics, taught by Camelia Mahmood PT at 10/6/2023  1:40 PM.  Learner: Patient  Readiness: Acceptance  Method: Explanation, Handout, Demonstration  Response: Verbalizes Understanding, Needs Reinforcement    Home Exercise Program, taught by Camelia Mahmood PT at 10/6/2023  1:40 PM.  Learner: Patient  Readiness: Acceptance  Method: Explanation, Handout, Demonstration  Response: Verbalizes Understanding, Needs Reinforcement    Mobility Training, taught by Camelia Mahmood PT at 10/6/2023  1:40 PM.  Learner: Patient  Readiness: Acceptance  Method: Explanation, Handout, Demonstration  Response: Verbalizes Understanding, Needs Reinforcement    Education Comments  No comments found.    Pt left in recliner with ice on R LOWER EXTREMITY, call bell in reach, all needs met. RN notified of status     SECOND ATTEMPT successful with patient able to complete steps, ambulate further distance and had improved strength in RLE without buckle or lOB. Pt is safe to DC home with [facility] PT and 24 hour supervision with use of RW.    Camelia Mahmood PT

## 2023-10-06 NOTE — ANESTHESIA PREPROCEDURE EVALUATION
Patient: Migue Adair    Procedure Information       Date/Time: 10/06/23 0700    Procedure: RIGHT TOTAL KNEE REPLACEMENT (DEPUY) (Right: Knee)    Location: [facility] OR 08 / [facility]    Surgeons: Андрей Negrete MD            Relevant Problems   Anesthesia (within normal limits)      Cardiovascular   (+) HTN (hypertension)      Endocrine   (+) Diabetes mellitus, type 2 (CMS/HCC)   (+) Obesity      GI (within normal limits)      /Renal (within normal limits)      Neuro/Psych   (+) Peripheral neuropathy      Pulmonary  Quit tob 3 yrs ago   (+) TORSTEN (obstructive sleep apnea) (Has CPAP, rarely uses, did not bring)      GI/Hepatic (within normal limits)      Hematology (within normal limits)      Musculoskeletal   (+) Primary osteoarthritis of right knee      Eyes, Ears, Nose, and Throat (within normal limits)      Infectious Disease (within normal limits)       Clinical information reviewed:   Tobacco  Allergies  Meds   Med Hx  Surg Hx   Fam Hx  Soc Hx        NPO Detail:  NPO/Void Status  Date of Last Liquid: 10/06/23  Time of Last Liquid: 0430  Date of Last Solid: 10/05/23  Time of Last Solid: 2200  Time of Last Void: 0430         Physical Exam    Airway  Mallampati: III  TM distance: >3 FB  Neck ROM: full     Cardiovascular    Dental    Pulmonary    Abdominal          Anesthesia Plan    ASA 3     spinal and regional   (ACB + I PACK block)  The patient is not a current smoker.  Patient was not previously instructed to abstain from smoking on day of procedure.  Patient did not smoke on day of procedure.  Education provided regarding risk of obstructive sleep apnea.  intravenous induction   Postoperative administration of opioids is intended.  Anesthetic plan and risks discussed with patient and spouse.  Use of blood products discussed with patient and spouse who.    Plan discussed with CAA.

## 2023-10-06 NOTE — ANESTHESIA POSTPROCEDURE EVALUATION
Patient: Migue Adair    Procedure Summary       Date: 10/06/23 Room / Location: [facility] / [facility]    Anesthesia Start: 0713 Anesthesia Stop: 1007    Procedure: RIGHT TOTAL KNEE REPLACEMENT (DEPUY) (Right: Knee) Diagnosis:     Surgeons: Андрей Negrete MD Responsible Provider: Marielos Martins MD MPH    Anesthesia Type: spinal, regional ASA Status: 3            Anesthesia Type: spinal, regional    Vitals Value Taken Time   /92 10/06/23 1020   Temp 36.1 °C (97 °F) 10/06/23 1010   Pulse 77 10/06/23 1020   Resp 15 10/06/23 1020   SpO2 98 % 10/06/23 1020       Anesthesia Post Evaluation    Patient location during evaluation: PACU  Patient participation: complete - patient participated  Level of consciousness: awake and alert  Pain score: 0  Pain management: adequate  Multimodal analgesia pain management approach  Airway patency: patent  Two or more strategies used to mitigate risk of obstructive sleep apnea  Cardiovascular status: acceptable  Respiratory status: acceptable  Hydration status: acceptable    There were no known notable events for this encounter.

## 2023-10-06 NOTE — PROGRESS NOTES
Medication Education     Medication education for Migue Adair was provided to the patient and family for the following medication(s):  Aspirin  Oxycodone  Tramadol  Cefadroxil  Zofran  Docusate  Senna  Tylenol  M2B delivered.      Medication education provided by a Pharmacist:  Medication interactions Dose, frequency, storage How to take and what to do if a dose is missed Refilling the medication  How the medication works and benefits of taking it Benefits of taking the medication     Identified potential barriers to education:  None    Method(s) of Education:  Verbal Written materials provided and reviewed    An opportunity to ask questions and receive answers was provided.     Assessment of understanding the patient and family:  2= meets goals/outcomes    Additional Notes (if applicable):     Shaquille Kern, PharmD

## 2023-10-06 NOTE — OP NOTE
RIGHT TOTAL KNEE REPLACEMENT (DEPUY) (R) Operative Note     Date: 10/6/2023  OR Location: [facility]    Name: Migue Adair, : 1965, Age: 58 y.o., MRN: [id number], Sex: male    Diagnosis  * No Diagnosis Codes entered * * No Diagnosis Codes entered *     Procedures    * RIGHT TOTAL KNEE REPLACEMENT (DEPUY)    Surgeons      * Андрей Negrete - Primary    Resident/Fellow/Other Assistant:  Shereen Taylor MD     Procedure Summary  Anesthesia: General  ASA: III  Anesthesia Staff: Anesthesiologist: Marielos Martins MD MPH  C-AA: JORGE Blake  Estimated Blood Loss: 25mL  Intra-op Medications:   Medication Name Total Dose   ropivacaine-epinephrine-clonidine-ketorolac 2.46-0.005- 0.0008-0.3mg/mL periarticular syringe 50 mL   lactated Ringer's infusion Cannot be calculated   ceFAZolin in dextrose (iso-os) (Ancef) IVPB 2 g 3 g   lactated Ringer's infusion 27.22 mL              Anesthesia Record               Intraprocedure I/O Totals          Intake    Propofol Drip 118.78 mL    The total shown is the total volume documented since Anesthesia Start was filed.    lactated Ringer's infusion 2700.00 mL    ceFAZolin in dextrose (iso-os) (Ancef) IVPB 2 g 110.00 mL    Total Intake 2928.78 mL       Output    Est. Blood Loss 50 mL    Total Output 50 mL       Net    Net Volume 2878.78 mL          Specimen: No specimens collected     Staff:   Circulator: Maricel Grewal RN  Relief Circulator: Ghislaine Varner RN  Scrub Person: Francisca Parekh; Chayito Werner RN; Kassidy Douglas; Twyla Garrison; Franchesca Baca         Drains and/or Catheters: * None in log *    Tourniquet Times:         Implants:  Implants       Type Name Action Serial No.      Joint Knee DOME, PATELLA, MEDIALIZED, 38MM - [id number] Implanted       SIZE 7 ATTUNE FIXED BEARING TIBIAL BASE, POROUS Implanted      Implant Attune Femoral Porocoat Cruciate Retaining Size 8 Right Cementless Implanted [id number]              Findings: Consistent with pre-op imaging.      Implants:   Depuy Attune CR Femur Size 8 Porcoat    Depuy Attune Fixed Bearing Tibia Size 7 Affixium   Depuy Polyethylene Patella Size 38  Polyethylene insert Size 8, 7 mm Thickness       Anesthesia: Spinal      Tourniquet Time: 120 min       Medications: Ancef and TXA       Position: Supine       Blood loss: 25 cc       Complications: None       Specimen: None       INDICATIONS:   The patient is a 58 y.o. year old male with a longstanding history of knee pain secondary to end stage osteoarthritis. The patient failed non-operative treatment to include anti-inflammatories, rest, and activity modification. Radiographs showed bone-on-bone osteoarthritis.  After discussing the options, risks, benefits, and possible complications, the patient elected to undergo a total knee arthroplasty. The patient understood the risks including but are not limited to infection, fracture, loosening of the components, failure of the implants, loss of range of motion, stiffness, chronic pain, opioid addiction, disability, wear, loosening, reaction to implanted materials, DVT, PE, MI, stroke, loss of limb, loss of life, or potential need for further surgery. The patient understands these risks and has elected to proceed.       TECHNIQUE:   The patient was properly identified in the holding area using name, medical record number, and date of birth. Informed consent was then signed and the operative extremity was marked. The patient had an opportunity to ask questions which were answered. The procedure, risks, benefits and alternatives were reviewed. Next, the patient was brought back to the operating room. Spinal anesthesia was initiated without difficulty. The patient was placed in the supine position on the table. All abdelrahman prominences were well padded. A sequential compression device was applied to the contralateral extremity. The operative lower extremity was prepped and draped in usual sterile fashion. The patient received ancef  and TXA prior to incision.   A preprocedural timeout was then performed once again confirming the patient's identity, procedure, and laterality. In addition, allergy status and required equipment were reviewed. Three independent members of the operating room team agreed on the above-mentioned parameters. The pre-operative marking was still visible.       The leg was then exsanguinated, tourniquet inflated, incision made in the midline.  Soft tissue dissected down to the retinaculum was performed. A medial parapatellar arthrotomy was performed.  Partial medial synovectomy was performed. A medial release was performed.  Fat pad was resected laterally. The anterior medial and lateral meniscus were released. ACL was transected.       Using the step drill, the femoral canal was opened.  We then used bulb irrigation to irrigate the intramedullary canal.  The intramedullary tej was introduced into the femur.  The distal femoral cut angle was set at 5 degrees. Distal resection block was pinned in place.  Collateral retractors were placed and the distal femur was resected.       Attention was then turned to the patella.  The patella measured 24 mm centrally. It was then cut to 16 mm. The patella was checked to ensure it was symmetric. The patella was then sized and prepared using the guide. A size 38 mm was found to be appropriate.  I placed a patella protector and flexed up the knee.       We then turned our attention to the proximal tibia. Medial, lateral and posterior retractors were placed. The extramedullary guide was set for 5 degrees of posterior slope and neutral varus/valgus angulation.  The block was pinned in place and the proximal tibia was resected.  The cut was verified to ensure that it was flat and not in varus or valgus.       The knee was brought into extension and a medial and lateral meniscectomy were performed.       We then turned our attention back to the femur.  The sizing guide was introduced and  pinned in place.  The femur was sized to a size 8.  Appropriate pins were placed.  The sizing block was then removed and the 4-in-1 cutting block was introduced and pinned in place.  The anterior, posterior and chamfer cuts were then made.  The cutting block was removed.       Attention was then turned to the posterior osteophytes. Using a curved 3/4 inch osteotome, the osteophytes were removed. We then used a cob to elevate the posterior capsule from the femur.       The tibia was subluxed and a trial base plate was used to size the tibia. A size 7 was appropriate. This was secured in place with provisional pins. The femoral trail was then impacted. A trial polyethylene was inserted.  The knee was tight to flexion.  Accordingly, I first removed all the trials and then increase the slope to 7 degrees.  The tibia was recut.  Trials were then replaced.  The knee was still slightly tight.  The PCL was partially recessed.    The knee was able to be flexed to 120 degrees. The knee was stable to both varus and valgus stress through full range of motion, including mid flexion. The knee was checked again and found to be symmetric. The patella tracked well. The trial polyethylene was removed, and the femoral lug holes were drilled. The final tibial preparation was performed. All trials were removed. The knee was copiously irrigated and dried.       The cement was mixed and the final implants were cemented into place starting with the tibia. Excess cement was removed. A trial polyethylene was placed and the knee was held in full extension to allow the cement to harden.     Finally, the trial polyethylene was replaced with the definitive insert. A 7 mm thickness was appropriate. The knee was again brought through a full range of motion. It was stable to both varus and valgus stress through a full range of motion. The patella tracked centrally. The joint mix was injected into the local soft tissues and the wound was irrigated  with copious pulse lavage.       The knee was again copiously irrigated. The retinaculum was approximated with #2 ethibond and then closed with #1 Stratafix. Deep subcutaneous and dermis closed with 0 and 2-0 Vicryl in simple interrupted fashion. Skin closure with Monocryl and skin glue.       The patient tolerated the procedure well with no complications and was taken from the operating room in stable condition. All sponge and needle counts were correct at the end of the case.       I attest that I was present and scrubbed for all critical parts of the procedure.     Post Operative Plan:  Weight bearing as tolerated  Aspirin 81 mg BID for 4 weeks for VTE prophylaxis  Post operative antibiotics in PACU  Disposition: Same day discharge    Complications:  None; patient tolerated the procedure well.    Disposition: PACU - hemodynamically stable.  Condition: stable         Additional Details: NA    Attending Attestation: I was present and scrubbed for the entire procedure.    Андрей Negrete  Phone Number: [phone]

## 2023-10-07 ENCOUNTER — HOME CARE VISIT (OUTPATIENT)
Dept: HOME HEALTH SERVICES | Facility: HOME HEALTH | Age: 58
End: 2023-10-07
Payer: COMMERCIAL

## 2023-10-07 VITALS
BODY MASS INDEX: 33.37 KG/M2 | WEIGHT: 260 LBS | SYSTOLIC BLOOD PRESSURE: 160 MMHG | HEIGHT: 74 IN | DIASTOLIC BLOOD PRESSURE: 88 MMHG | HEART RATE: 78 BPM | RESPIRATION RATE: 16 BRPM

## 2023-10-07 PROCEDURE — G0151 HHCP-SERV OF PT,EA 15 MIN: HCPCS

## 2023-10-07 PROCEDURE — 0023 HH SOC

## 2023-10-07 SDOH — HEALTH STABILITY: PHYSICAL HEALTH: EXERCISE COMMENTS: SUPINE QUAD SETS, ANKLE PUMPS, HEEL SLIDES, SAQ

## 2023-10-07 ASSESSMENT — ENCOUNTER SYMPTOMS
PAIN LOCATION: RIGHT KNEE
LIMITED RANGE OF MOTION: 1
PAIN: 1
LOSS OF SENSATION IN FEET: 1
HIGHEST PAIN SEVERITY IN PAST 24 HOURS: 9/10
LOWEST PAIN SEVERITY IN PAST 24 HOURS: 7/10
PAIN SEVERITY GOAL: 0/10
MUSCLE WEAKNESS: 1
SUBJECTIVE PAIN PROGRESSION: UNCHANGED
OCCASIONAL FEELINGS OF UNSTEADINESS: 1
DEPRESSION: 0

## 2023-10-07 ASSESSMENT — ACTIVITIES OF DAILY LIVING (ADL)
AMBULATION_DISTANCE/DURATION_TOLERATED: 30 FEET
AMBULATION ASSISTANCE ON FLAT SURFACES: 1
PHYSICAL TRANSFERS ASSESSED: 1
AMBULATION ASSISTANCE: 1
ENTERING_EXITING_HOME: MINIMUM ASSIST
CURRENT_FUNCTION: MINIMUM ASSIST
OASIS_M1830: 05
AMBULATION ASSISTANCE: ONE PERSON

## 2023-10-07 ASSESSMENT — PAIN SCALES - PAIN ASSESSMENT IN ADVANCED DEMENTIA (PAINAD)
BREATHING: 0
CONSOLABILITY: 0
BODYLANGUAGE: 0 - RELAXED.
NEGVOCALIZATION: 0
FACIALEXPRESSION: 0 - SMILING OR INEXPRESSIVE.
CONSOLABILITY: 0 - NO NEED TO CONSOLE.
NEGVOCALIZATION: 0 - NONE.
TOTALSCORE: 0
BODYLANGUAGE: 0
FACIALEXPRESSION: 0

## 2023-10-07 NOTE — HOME HEALTH
Patient is an 58 yr old male who had R TKR on 10.6.23 by Dr Padron at [facility]. PMH DMII, neuropathy, GERD. HTN, TORSTEN. PFS Patient lives in home with wife, ambulating without device indep, indep with ADLS, driving. Patiens goal is to decrease pain

## 2023-10-10 ENCOUNTER — HOME CARE VISIT (OUTPATIENT)
Dept: HOME HEALTH SERVICES | Facility: HOME HEALTH | Age: 58
End: 2023-10-10
Payer: COMMERCIAL

## 2023-10-10 PROCEDURE — G0157 HHC PT ASSISTANT EA 15: HCPCS

## 2023-10-10 ASSESSMENT — PAIN SCALES - GENERAL: PAINLEVEL_OUTOF10: 0 - NO PAIN

## 2023-10-12 ENCOUNTER — HOME CARE VISIT (OUTPATIENT)
Dept: HOME HEALTH SERVICES | Facility: HOME HEALTH | Age: 58
End: 2023-10-12
Payer: COMMERCIAL

## 2023-10-12 PROCEDURE — G0157 HHC PT ASSISTANT EA 15: HCPCS

## 2023-10-16 ENCOUNTER — HOME CARE VISIT (OUTPATIENT)
Dept: HOME HEALTH SERVICES | Facility: HOME HEALTH | Age: 58
End: 2023-10-16
Payer: COMMERCIAL

## 2023-10-16 PROCEDURE — G0157 HHC PT ASSISTANT EA 15: HCPCS

## 2023-10-17 DIAGNOSIS — Z47.1 AFTERCARE FOLLOWING KNEE JOINT REPLACEMENT SURGERY, UNSPECIFIED LATERALITY: Primary | ICD-10-CM

## 2023-10-17 DIAGNOSIS — Z96.659 AFTERCARE FOLLOWING KNEE JOINT REPLACEMENT SURGERY, UNSPECIFIED LATERALITY: Primary | ICD-10-CM

## 2023-10-17 RX ORDER — OXYCODONE HYDROCHLORIDE 5 MG/1
5-10 TABLET ORAL EVERY 6 HOURS PRN
Qty: 40 TABLET | Refills: 0 | Status: SHIPPED | OUTPATIENT
Start: 2023-10-17 | End: 2023-10-24

## 2023-10-17 RX ORDER — TRAMADOL HYDROCHLORIDE 50 MG/1
50-100 TABLET ORAL EVERY 6 HOURS PRN
Qty: 40 TABLET | Refills: 0 | Status: SHIPPED | OUTPATIENT
Start: 2023-10-17 | End: 2023-10-24

## 2023-10-18 ENCOUNTER — HOME CARE VISIT (OUTPATIENT)
Dept: HOME HEALTH SERVICES | Facility: HOME HEALTH | Age: 58
End: 2023-10-18
Payer: COMMERCIAL

## 2023-10-18 PROCEDURE — G0157 HHC PT ASSISTANT EA 15: HCPCS

## 2023-10-18 NOTE — TELEPHONE ENCOUNTER
Due to the nature of the surgery and the necessary post-operative rehabilitation, the patient will require more than 30 morphine MEQ per day for 6 days. The patient occasionally patient rates his pain a 9 or 10 on the pain scale.  I have personally reviewed the OARRS report for this patient. This report is scanned into the electronic medical record. I have considered the risks of abuse, dependence, addiction and diversion.

## 2023-10-23 ENCOUNTER — HOME CARE VISIT (OUTPATIENT)
Dept: HOME HEALTH SERVICES | Facility: HOME HEALTH | Age: 58
End: 2023-10-23
Payer: COMMERCIAL

## 2023-10-23 PROBLEM — M25.561 PAIN IN RIGHT KNEE: Status: RESOLVED | Noted: 2023-04-17 | Resolved: 2023-10-23

## 2023-10-23 PROBLEM — R49.0 HOARSENESS OF VOICE: Status: RESOLVED | Noted: 2023-04-17 | Resolved: 2023-10-23

## 2023-10-23 PROBLEM — F17.200 TOBACCO USE DISORDER: Status: RESOLVED | Noted: 2023-04-17 | Resolved: 2023-10-23

## 2023-10-23 PROBLEM — M62.830 BACK SPASM: Status: RESOLVED | Noted: 2023-04-17 | Resolved: 2023-10-23

## 2023-10-23 PROBLEM — E11.9 DIABETES MELLITUS (MULTI): Status: RESOLVED | Noted: 2023-04-17 | Resolved: 2023-10-23

## 2023-10-23 PROBLEM — M54.50 LOW BACK PAIN: Status: RESOLVED | Noted: 2023-04-17 | Resolved: 2023-10-23

## 2023-10-23 PROBLEM — M25.569 KNEE PAIN: Status: RESOLVED | Noted: 2023-04-17 | Resolved: 2023-10-23

## 2023-10-23 PROBLEM — R07.0 THROAT PAIN IN ADULT: Status: RESOLVED | Noted: 2023-04-17 | Resolved: 2023-10-23

## 2023-10-23 PROBLEM — E11.9 TYPE 2 DIABETES MELLITUS WITHOUT COMPLICATIONS (MULTI): Status: RESOLVED | Noted: 2023-04-17 | Resolved: 2023-10-23

## 2023-10-23 PROBLEM — M17.11 PRIMARY OSTEOARTHRITIS OF RIGHT KNEE: Status: RESOLVED | Noted: 2023-10-05 | Resolved: 2023-10-23

## 2023-10-23 PROCEDURE — G0157 HHC PT ASSISTANT EA 15: HCPCS

## 2023-10-23 RX ORDER — CHLORHEXIDINE GLUCONATE ORAL RINSE 1.2 MG/ML
SOLUTION DENTAL
COMMUNITY
Start: 2023-10-04

## 2023-10-25 ENCOUNTER — ANCILLARY PROCEDURE (OUTPATIENT)
Dept: RADIOLOGY | Facility: CLINIC | Age: 58
End: 2023-10-25
Payer: COMMERCIAL

## 2023-10-25 ENCOUNTER — HOME CARE VISIT (OUTPATIENT)
Dept: HOME HEALTH SERVICES | Facility: HOME HEALTH | Age: 58
End: 2023-10-25
Payer: COMMERCIAL

## 2023-10-25 ENCOUNTER — OFFICE VISIT (OUTPATIENT)
Dept: ORTHOPEDIC SURGERY | Facility: CLINIC | Age: 58
End: 2023-10-25
Payer: COMMERCIAL

## 2023-10-25 DIAGNOSIS — M17.0 PRIMARY LOCALIZED OSTEOARTHRITIS OF BOTH KNEES: ICD-10-CM

## 2023-10-25 DIAGNOSIS — Z47.1 AFTERCARE FOLLOWING RIGHT KNEE JOINT REPLACEMENT SURGERY: Primary | ICD-10-CM

## 2023-10-25 DIAGNOSIS — Z96.651 AFTERCARE FOLLOWING RIGHT KNEE JOINT REPLACEMENT SURGERY: Primary | ICD-10-CM

## 2023-10-25 PROCEDURE — 99024 POSTOP FOLLOW-UP VISIT: CPT | Performed by: STUDENT IN AN ORGANIZED HEALTH CARE EDUCATION/TRAINING PROGRAM

## 2023-10-25 PROCEDURE — 73562 X-RAY EXAM OF KNEE 3: CPT | Mod: RT,FY

## 2023-10-25 PROCEDURE — G0157 HHC PT ASSISTANT EA 15: HCPCS

## 2023-10-25 PROCEDURE — 1036F TOBACCO NON-USER: CPT | Performed by: STUDENT IN AN ORGANIZED HEALTH CARE EDUCATION/TRAINING PROGRAM

## 2023-10-25 PROCEDURE — 3044F HG A1C LEVEL LT 7.0%: CPT | Performed by: STUDENT IN AN ORGANIZED HEALTH CARE EDUCATION/TRAINING PROGRAM

## 2023-10-25 PROCEDURE — 73562 X-RAY EXAM OF KNEE 3: CPT | Mod: RIGHT SIDE | Performed by: RADIOLOGY

## 2023-10-25 ASSESSMENT — PAIN - FUNCTIONAL ASSESSMENT: PAIN_FUNCTIONAL_ASSESSMENT: NO/DENIES PAIN

## 2023-10-26 ENCOUNTER — APPOINTMENT (OUTPATIENT)
Dept: HOME HEALTH SERVICES | Facility: HOME HEALTH | Age: 58
End: 2023-10-26
Payer: COMMERCIAL

## 2023-10-30 ENCOUNTER — HOME CARE VISIT (OUTPATIENT)
Dept: HOME HEALTH SERVICES | Facility: HOME HEALTH | Age: 58
End: 2023-10-30
Payer: COMMERCIAL

## 2023-10-30 VITALS — HEART RATE: 83 BPM | OXYGEN SATURATION: 99 % | DIASTOLIC BLOOD PRESSURE: 70 MMHG | SYSTOLIC BLOOD PRESSURE: 115 MMHG

## 2023-10-30 PROCEDURE — G0151 HHCP-SERV OF PT,EA 15 MIN: HCPCS

## 2023-10-30 ASSESSMENT — ENCOUNTER SYMPTOMS
PAIN LOCATION - RELIEVING FACTORS: PAIN MEDS, ICING
PAIN LOCATION - PAIN FREQUENCY: INTERMITTENT
PAIN: 1
LIMITED RANGE OF MOTION: 1
MUSCLE WEAKNESS: 1
PAIN LOCATION: RIGHT KNEE
SUBJECTIVE PAIN PROGRESSION: GRADUALLY IMPROVING
PAIN LOCATION - PAIN SEVERITY: 4/10
PERSON REPORTING PAIN: PATIENT
PAIN LOCATION - EXACERBATING FACTORS: BENDING
HIGHEST PAIN SEVERITY IN PAST 24 HOURS: 7/10

## 2023-10-30 ASSESSMENT — ACTIVITIES OF DAILY LIVING (ADL)
BATHING ASSESSED: 1
AMBULATION ASSISTANCE: INDEPENDENT
OASIS_M1830: 01
DRESSING_UB_CURRENT_FUNCTION: INDEPENDENT
AMBULATION ASSISTANCE ON FLAT SURFACES: 1
DRESSING_LB_CURRENT_FUNCTION: INDEPENDENT
HOME_HEALTH_OASIS: 00
AMBULATION ASSISTANCE: 1
BATHING_CURRENT_FUNCTION: INDEPENDENT

## 2023-10-31 ENCOUNTER — EVALUATION (OUTPATIENT)
Dept: PHYSICAL THERAPY | Facility: CLINIC | Age: 58
End: 2023-10-31
Payer: COMMERCIAL

## 2023-10-31 DIAGNOSIS — M25.561 RIGHT KNEE PAIN, UNSPECIFIED CHRONICITY: Primary | ICD-10-CM

## 2023-10-31 PROCEDURE — 97110 THERAPEUTIC EXERCISES: CPT | Mod: GP

## 2023-10-31 PROCEDURE — 97161 PT EVAL LOW COMPLEX 20 MIN: CPT | Mod: GP

## 2023-10-31 PROCEDURE — 97016 VASOPNEUMATIC DEVICE THERAPY: CPT | Mod: GP

## 2023-10-31 ASSESSMENT — ENCOUNTER SYMPTOMS
LOSS OF SENSATION IN FEET: 1
DEPRESSION: 0
OCCASIONAL FEELINGS OF UNSTEADINESS: 0

## 2023-10-31 ASSESSMENT — PAIN SCALES - GENERAL: PAINLEVEL_OUTOF10: 7

## 2023-10-31 ASSESSMENT — PAIN - FUNCTIONAL ASSESSMENT: PAIN_FUNCTIONAL_ASSESSMENT: 0-10

## 2023-10-31 NOTE — Clinical Note
October 31, 2023     Patient: Migue Adair   YOB: 1965   Date of Visit: 10/31/2023       To Whom It May Concern:    It is my medical opinion that Migue Adair {Work release (duty restriction):71922}.    If you have any questions or concerns, please don't hesitate to call.         Sincerely,        Kaushal Luna, PT    CC: No Recipients

## 2023-10-31 NOTE — Clinical Note
October 31, 2023     Patient: Migue Adair   YOB: 1965   Date of Visit: 10/31/2023       To Whom it May Concern:    Migue Adair was seen in my clinic on 10/31/2023. He {Return to school/sport:99695}.    If you have any questions or concerns, please don't hesitate to call.         Sincerely,          Kaushal Luna, PT        CC: No Recipients

## 2023-10-31 NOTE — PROGRESS NOTES
Physical Therapy    Physical Therapy Evaluation    Patient Name: Migue Adair  MRN: [id number]  Today's Date: 11/1/2023  Time Calculation  Start Time: 0200  Stop Time: 0315  Time Calculation (min): 75 min    Visit #1   Assessment  PT Assessment Results: Decreased strength, Decreased range of motion  Rehab Prognosis: Good  Evaluation/Treatment Tolerance: Patient tolerated treatment well, Patient limited by pain  Pt is a 59 y/o M presenting to PT eval about 3 weeks p/o R TKA (10/6/23). Pt just started using cane for ambulation, was previously using a walker. Limitations noted in R knee flexion and extension AROM. Moderate swelling still remains, about 5 cm difference R knee compared to L knee circumference. Main limitations noted in gait speed, gait mechanics, decreased strength in R hip flexors, abductors and extensors, and navigating stairs. Pt would like to improve his gait and return to baseline function. Pt would benefit from skilled OP PT to address AROM and strength deficits, and enhance functional mobility. Initiated exercises to maintain at home, pt tolerated well. Pt understands and is in agreement with POC. Will see pt for 2xweek/ 5 weeks.     Plan  Treatment/Interventions: Education/ Instruction  PT Plan: Skilled PT  PT Frequency: 2 times per week  Duration: 5 weeks  Rehab Potential: Good    Current Problem  1. Right knee pain, unspecified chronicity            Subjective   General: Pt had R knee replacement done 10/6/23. Had 4 sessions with home PT. States progress has been slow and painful. Was not using cane before surgery. Every 3 hours is waking up due to pain. Main difficulties are walking, sit to stand, stairs. In house 2-3 step inside and about 13 inside, using hand rail. Pt goal: get close to getting back to normal and reduce pain. Arthritis in L knee as well. Off work currently, works as a Conductor .     General  Reason for Referral: OA of both knees  Referred By: Caden  MD    Precautions:  Precautions  EDSON Fall Risk Score (The score of 4 or more indicates an increased risk of falling): 7     Pain:  Pain Assessment: 0-10  Pain Score: 7  Pain Type: Surgical pain  Pain Location: Knee  Pain Orientation: Right  Home Living: 3 steps to get in, 13 steps inside      Prior Function Per Pt/Caregiver Report: no use of cane prior to surgery     Objective   Observation: stiches not fully dissolved yet, mild swelling remains      Range of Motion:  R knee flexion 95 sitting   R knee flexion supine 89   R knee extension supine 11-0      Strength:  R Quad: 4-/5   R SLR: 2+/5   R Abduction: 3-/5      Flexibility: observed hamstring/calf tightness       Gait: increased use of cane, lacking full knee extension in terminal swing   Gait speed 10m: 0.33m/s     Stairs: up with L, down with R, 1 step at a time use of 1 rail and cane       Other:   R knee circumference: 46 cm   L knee circumference:  41.5 cm     5x sit to stand: 24s (favors weight onto L LE)    Outcome Measures:  Other Measures  Lower Extremity Funtional Score (LEFS): 15/80     OP EDUCATION:  Education  Individual(s) Educated: Patient  Education Provided: Home Exercise Program, POC  Risk and Benefits Discussed with Patient/Caregiver/Other: yes  Patient/Caregiver Demonstrated Understanding: yes  Plan of Care Discussed and Agreed Upon: yes  Patient Response to Education: Patient/Caregiver Asked Appropriate Questions, Patient/Caregiver Verbalized Understanding of Information  Treatment:   Bike for knee ROM (2 min)   Standing hamstring stretch on step   Standing calf stretch off slanted board.   Game Ready x10 mins to R knee    Goals:  Active       PT Problem       PT Goal 1       Start:  10/31/23    Expected End:  12/30/23       Decrease pain to <4/10 50% of the time to demonstrate improved overall QoL.          PT Goal 2       Start:  10/31/23    Expected End:  12/30/23       Patient R knee extension ROM will improve by 5 degrees or more  for functional ambulation.          PT Goal 3       Start:  10/31/23    Expected End:  12/30/23       Pt R knee flexion will improve from 89 to 120 or equal to L knee flexion for functional ambulation.          PT Goal 4       Start:  10/31/23    Expected End:  12/30/23       Pt will improve R hip flexion, abduction, adduction, and extension strength by 1 grade or more demonstrating improvements with daily activities.          PT Goal 5       Start:  10/31/23    Expected End:  12/30/23       Pt will be indep with HEP and instructed functional activities.             PT Problem       Patient Stated Goal 1       Start:  10/31/23    Expected End:  12/30/23       Pt will improve gait mechanics displaying proper knee extension, heel strike, and toe off with no assistive device.

## 2023-11-02 PROCEDURE — G0180 MD CERTIFICATION HHA PATIENT: HCPCS

## 2023-11-03 NOTE — PERIOPERATIVE NURSING NOTE
[facility]  Discharge Final Note    Primary Care Provider: Aretha, Primary Doctor    Expected Discharge Date: 11/3/2023    Pt to schedule own f/u when she returns to [place].   Pt discharged home with no services.  Corey Humphrey, RN,BSN        Final Discharge Note (most recent)       Final Note - 11/03/23 1227          Final Note    Assessment Type Final Discharge Note     Anticipated Discharge Disposition Home or Self Care     Hospital Resources/Appts/Education Provided Provided patient/caregiver with written discharge plan information;Appointments scheduled and added to AVS;Post-Acute resouces added to AVS        Post-Acute Status    Discharge Delays None known at this time                     Important Message from Medicare             Contact Info       Will f/u with PCP in [place]        Next Steps: Follow up             PT at bedside.

## 2023-11-04 DIAGNOSIS — Z96.659 STATUS POST TOTAL KNEE REPLACEMENT, UNSPECIFIED LATERALITY: ICD-10-CM

## 2023-11-04 DIAGNOSIS — M17.11 PRIMARY OSTEOARTHRITIS OF RIGHT KNEE: ICD-10-CM

## 2023-11-06 DIAGNOSIS — Z47.1 AFTERCARE FOLLOWING RIGHT KNEE JOINT REPLACEMENT SURGERY: Primary | ICD-10-CM

## 2023-11-06 DIAGNOSIS — Z96.651 AFTERCARE FOLLOWING RIGHT KNEE JOINT REPLACEMENT SURGERY: Primary | ICD-10-CM

## 2023-11-06 RX ORDER — OXYCODONE HYDROCHLORIDE 5 MG/1
5-10 TABLET ORAL EVERY 6 HOURS PRN
Qty: 40 TABLET | Refills: 0 | Status: SHIPPED | OUTPATIENT
Start: 2023-11-06 | End: 2023-11-13

## 2023-11-06 RX ORDER — TRAMADOL HYDROCHLORIDE 50 MG/1
50-100 TABLET ORAL EVERY 6 HOURS PRN
Qty: 40 TABLET | Refills: 0 | Status: SHIPPED | OUTPATIENT
Start: 2023-11-06 | End: 2023-11-13

## 2023-11-08 ENCOUNTER — TELEPHONE (OUTPATIENT)
Dept: PRIMARY CARE | Facility: CLINIC | Age: 58
End: 2023-11-08

## 2023-11-08 ENCOUNTER — OFFICE VISIT (OUTPATIENT)
Dept: PRIMARY CARE | Facility: CLINIC | Age: 58
End: 2023-11-08
Payer: COMMERCIAL

## 2023-11-08 VITALS
OXYGEN SATURATION: 97 % | HEIGHT: 74 IN | HEART RATE: 98 BPM | DIASTOLIC BLOOD PRESSURE: 80 MMHG | WEIGHT: 260 LBS | SYSTOLIC BLOOD PRESSURE: 126 MMHG | BODY MASS INDEX: 33.37 KG/M2

## 2023-11-08 DIAGNOSIS — G47.33 OSA (OBSTRUCTIVE SLEEP APNEA): Primary | ICD-10-CM

## 2023-11-08 DIAGNOSIS — M17.0 PRIMARY LOCALIZED OSTEOARTHRITIS OF BOTH KNEES: ICD-10-CM

## 2023-11-08 DIAGNOSIS — M25.561 RIGHT KNEE PAIN, UNSPECIFIED CHRONICITY: ICD-10-CM

## 2023-11-08 DIAGNOSIS — G62.89 OTHER POLYNEUROPATHY: ICD-10-CM

## 2023-11-08 PROCEDURE — 1036F TOBACCO NON-USER: CPT | Performed by: INTERNAL MEDICINE

## 2023-11-08 PROCEDURE — 3044F HG A1C LEVEL LT 7.0%: CPT | Performed by: INTERNAL MEDICINE

## 2023-11-08 PROCEDURE — 3074F SYST BP LT 130 MM HG: CPT | Performed by: INTERNAL MEDICINE

## 2023-11-08 PROCEDURE — 3079F DIAST BP 80-89 MM HG: CPT | Performed by: INTERNAL MEDICINE

## 2023-11-08 PROCEDURE — 99214 OFFICE O/P EST MOD 30 MIN: CPT | Performed by: INTERNAL MEDICINE

## 2023-11-08 RX ORDER — NAPROXEN SODIUM 220 MG/1
81 TABLET, FILM COATED ORAL 2 TIMES DAILY
Qty: 60 TABLET | Refills: 0 | Status: CANCELLED | OUTPATIENT
Start: 2023-11-08 | End: 2023-12-08

## 2023-11-08 NOTE — PROGRESS NOTES
Subjective   Patient ID: Migue Adair is a 58 y.o. male who presents for Follow-up and paperwork (Disability/fmla).  HPI        old male with a PMHx of DM2, GERD, diabetic Neuropathy , status post right arm lipoma removal excision , pulmonary nodules 3 mm and severe right left knee osteoarthritis status post right total knee arthroplasty October 2023    Presents for follow-up he does have severe chronic knee pain for years right greater than left consistent grade 8 out of 10 he did have a right knee replacement recently still has a lot of pain there and swelling  Left knee arthralgia persists worse when he stands on it he cannot walk or work or stand on it due to pain  He has chronic neuropathy pins-and-needles that are worse with standing of the feet that are severe  Denies paralysis trauma        Health Maintenance:      Colonoscopy: 2017      Mammogram:      Pelvic/Pap:      Low dose chest CT:      Aorta duplex:      Optho:      Podiatry:        Vaccines:      Prevnar 20:      Prevnar 13:      Pneumovax 23:      Tdap:      Shingrix:      COVID:      Influenza:        ROS:      General: denies fever/chills/weight loss      Head: denies HA/trauma/masses/dizziness      Eyes: denies vision change/loss of vision/blurry vision/diplopia/eye pain      Ears: denies hearing loss/tinnitus/otalgia/otorrhea      Nose: denies nasal drainage/anosmia      Throat: denies dysphagia/odynophagia      Lymphatics: denies lymph node swelling      Cardiac: denies CP/palpitations/orthopnea/PND      Pulmonary: denies dyspnea/cough/wheezing      GI: denies abd pain/n/v/diarrhea/melena/hematochezia/hematemesis      : denies dysuria/hematuria/change frequency      Genital: denies genital discharge/lesions      Skin: denies rashes/lesions/masses      MSK: Chronic severe bilateral knee pain denies weakness/swelling/edema/gait imbalance/pain      Neuro: Chronic paresthesias bilateral feet persistent worse with standing denies  "paresthesias/seizures/dysarthria      Psych: denies depression/anxiety/suicidal or homicidal ideations            Objective   /80   Pulse 98   Ht 1.88 m (6' 2\")   Wt 118 kg (260 lb)   SpO2 97%   BMI 33.38 kg/m²      Physical Exam:     General: AO3, NAD     Head: atraumatic/NC     Eyes: EOMI/PERRLA. Negative APD     Ears: TM pearly gray, EAC clear. No lesions or erythema     Nose: symmetric nares, no discharge     Throat: trachea midline, uvula midline pink mucosa. No thyromegaly     Lymphatics: no cervical/supraclavicular/ant or posterior cervical adenopathy/axillary/inguinal adenopathy     Breast: not examined     Chest: no deformity or tenderness to palpation     Pulm: CTA b/l, no wheeze/rhonchi/rales. nonlabored     Cardiac: RRR +s1s2, no m/r/g.      GI: soft, NT/ND. Normoactive Bsx4. No rebound/guarding.     Rectal: no examined     MSK: Right anterior patella longitudinal scar clean dry intact with mild surrounding edema no fluctuance warmth or drainage 5/5 strength UE LE. No edema/clubbing/cyanosis     Skin: no rashes/lesions     Vascular: 2+ palp DP PT radials b/l. Negative carotid bruit     Neuro: CNII-XII intact. No focal deficits. Reflexes 2/4 brachioradialis bicep tricep patellar achilles. Finger to nose intact.     Psych: appropriate mood/affect                    No results found for: \"BMPR1A\", \"CBCDIF\"    10 minutes time spent completing disability paperwork  Assessment/Plan   Diagnoses and all orders for this visit:  TORSTEN (obstructive sleep apnea)  Other polyneuropathy  Right knee pain, unspecified chronicity  Comments:  Status post right total knee replacement October 6, 2023  Primary localized osteoarthritis of both knees    Disability forms completed for patient left in my out box with directions to scan and fax patient unable to work due to severe chronic knee pain and neuropathy of the feet    Call follow-up with orthopedics    Call follow-up with endocrinology    Screening blood work " due July 2024    Thank you for making appointment today Migue    Please follow-up 6 months       Bianca Gregorio MA

## 2023-11-08 NOTE — TELEPHONE ENCOUNTER
Pt notified, he is completed with DVT prophylaxis. DOS was 10/06/2023. Please sign order and close encounter.     Thank you!     Ct Hill LPN

## 2023-11-08 NOTE — TELEPHONE ENCOUNTER
Rx Refill Request Telephone Encounter    Name:  Migue Adair  :  [id number]  Medication Name:  Aspirin 81mg 1 tab BID post-op      Unsure of your protocols for POST-OP DVT prohpylaxis with Aspirin. I'm unable to deny the medication itself, it will still go to you to sign, but I can let the patient know when he's completed.     Please advise,     Thank you     Ct Hill LPN

## 2023-11-09 ENCOUNTER — TREATMENT (OUTPATIENT)
Dept: PHYSICAL THERAPY | Facility: CLINIC | Age: 58
End: 2023-11-09
Payer: COMMERCIAL

## 2023-11-09 DIAGNOSIS — M25.561 RIGHT KNEE PAIN, UNSPECIFIED CHRONICITY: Primary | ICD-10-CM

## 2023-11-09 PROCEDURE — 97110 THERAPEUTIC EXERCISES: CPT | Mod: GP,CQ

## 2023-11-09 ASSESSMENT — PAIN SCALES - GENERAL: PAINLEVEL_OUTOF10: 8

## 2023-11-09 ASSESSMENT — PAIN - FUNCTIONAL ASSESSMENT: PAIN_FUNCTIONAL_ASSESSMENT: 0-10

## 2023-11-09 ASSESSMENT — PAIN DESCRIPTION - DESCRIPTORS: DESCRIPTORS: ACHING

## 2023-11-09 NOTE — PROGRESS NOTES
Physical Therapy    Physical Therapy Treatment    Patient Name: Migue Adair  MRN: [id number]  Today's Date: 11/9/2023  Time Calculation  Start Time: 0530  Stop Time: 0615  Time Calculation (min): 45 min  Visit 2     Assessment:  PT Assessment  Evaluation/Treatment Tolerance: Patient tolerated treatment well  Tolerated heels slides and quad sets this session, although pain level required patient to sit up and take rest breaks. Completed full revolutions on recumbent bike this after minutes of warm up. Demonstrated right quad fatigue with long arc quad exercise secondary to muscular shaking. Gradual pain increase with right leg resting in extension/supine, from downward pressure. Instructed patient in putting pillow between ankles in addition to between the knees to remove slight lateral knee tension at night. Measurements will be taken after next session.     Plan:   Continue treatment per the current POC    Current Problem  1. Right knee pain, unspecified chronicity          Subjective   Arrives to therapy this evening with a current pain level of 8/10 through the right knee. Took pain medication prior to his therapy session. Compliant with his home exercise program twice a day, feels that he has had good results since starting. Sleeping on his left side will put a pillow between his knees and wake up with lateral knee pain. No other complaints at this time.     Pain  Pain Assessment: 0-10  Pain Score: 8  Pain Type: Acute pain  Pain Location: Knee  Pain Orientation: Right  Pain Descriptors: Aching  Pain Frequency: Constant/continuous    Objective     Treatments:  Therapeutic Exercise  Therapeutic Exercise Performed: Yes  Bike for knee ROM (5 min) - completed full revolutions this session  Strap heel slide knee flx x 12 - pain across top of knee   Quad set x 10 - pain across top of knee  Standing hamstring curl 2 x 10  Clam 2 x 10  Lunge on step (gentle) x 10  Standing hamstring stretch on step 2 x 20 sec  Standing  calf stretch off step 2 x 20 sec    OP EDUCATION:   Instructed patient in putting pillow between ankles in addition to between the knees to remove slight lateral knee tension at night.     Goals:  Active       PT Problem       PT Goal 1       Start:  10/31/23    Expected End:  12/30/23       Decrease pain to <4/10 50% of the time to demonstrate improved overall QoL.          PT Goal 2       Start:  10/31/23    Expected End:  12/30/23       Patient R knee extension ROM will improve by 5 degrees or more for functional ambulation.          PT Goal 3       Start:  10/31/23    Expected End:  12/30/23       Pt R knee flexion will improve from 89 to 120 or equal to L knee flexion for functional ambulation.          PT Goal 4       Start:  10/31/23    Expected End:  12/30/23       Pt will improve R hip flexion, abduction, adduction, and extension strength by 1 grade or more demonstrating improvements with daily activities.          PT Goal 5       Start:  10/31/23    Expected End:  12/30/23       Pt will be indep with HEP and instructed functional activities.             PT Problem       Patient Stated Goal 1       Start:  10/31/23    Expected End:  12/30/23       Pt will improve gait mechanics displaying proper knee extension, heel strike, and toe off with no assistive device.

## 2023-11-13 ENCOUNTER — TREATMENT (OUTPATIENT)
Dept: PHYSICAL THERAPY | Facility: CLINIC | Age: 58
End: 2023-11-13
Payer: COMMERCIAL

## 2023-11-13 DIAGNOSIS — M25.561 RIGHT KNEE PAIN, UNSPECIFIED CHRONICITY: ICD-10-CM

## 2023-11-13 PROCEDURE — 97010 HOT OR COLD PACKS THERAPY: CPT | Mod: GP

## 2023-11-13 PROCEDURE — 97110 THERAPEUTIC EXERCISES: CPT | Mod: GP

## 2023-11-13 ASSESSMENT — PAIN SCALES - GENERAL: PAINLEVEL_OUTOF10: 8

## 2023-11-13 ASSESSMENT — PAIN - FUNCTIONAL ASSESSMENT: PAIN_FUNCTIONAL_ASSESSMENT: 0-10

## 2023-11-13 ASSESSMENT — PAIN DESCRIPTION - DESCRIPTORS: DESCRIPTORS: ACHING

## 2023-11-13 NOTE — PROGRESS NOTES
Physical Therapy    Physical Therapy Treatment    Patient Name: Migue Adair  MRN: [id number]  Today's Date: 11/13/2023    Visit #3     Assessment: Therex tolerated. Recovery of knee extension and ROM progression in flexion remain priorities. Gait corrections initiated and tolerated. Ice application necessary due to knee edema.     Plan: Continue building strength, knee ROM, functional mobility endurance and gait corrections, as tolerable. Modalities for pain management, as needed.     Current Problem  1. Right knee pain, unspecified chronicity  Follow Up In Physical Therapy          Subjective ; Forgot to take pain pills this morning, and feeling my knee a little more.  Placing ice pack on knee daily, its still swollen.  General     Precautions; WBAT     Vital Signs; NA     Pain  Pain Assessment: 0-10  Pain Score: 8  Pain Type: Acute pain  Pain Location: Knee  Pain Orientation: Right  Pain Descriptors: Aching  Pain Frequency: Constant/continuous    Objective; NA     Posture  WNL upper body. Right knee with tendency to rest in standing without terminal knee extension  Outcome Measures:  LEFS = 15/80    Treatments:  SciFit Bike (seat R 14) X 10 minutes  Knee flexion on step  Short 4 inch step ups, leading with right foot  Standing Hamstring curls with toe tapping step behind heels  Heel lifts with knees straights  TKE in standing, assisted with blue t-bends  Heel slides with strap  SLRs  CP    OP EDUCATION:     Goals:  Active       PT Problem       PT Goal 1       Start:  10/31/23    Expected End:  12/30/23       Decrease pain to <4/10 50% of the time to demonstrate improved overall QoL.          PT Goal 2       Start:  10/31/23    Expected End:  12/30/23       Patient R knee extension ROM will improve by 5 degrees or more for functional ambulation.          PT Goal 3       Start:  10/31/23    Expected End:  12/30/23       Pt R knee flexion will improve from 89 to 120 or equal to L knee flexion for functional  ambulation.          PT Goal 4       Start:  10/31/23    Expected End:  12/30/23       Pt will improve R hip flexion, abduction, adduction, and extension strength by 1 grade or more demonstrating improvements with daily activities.          PT Goal 5       Start:  10/31/23    Expected End:  12/30/23       Pt will be indep with HEP and instructed functional activities.             PT Problem       Patient Stated Goal 1       Start:  10/31/23    Expected End:  12/30/23       Pt will improve gait mechanics displaying proper knee extension, heel strike, and toe off with no assistive device.

## 2023-11-25 RX ORDER — TRAMADOL HYDROCHLORIDE 50 MG/1
50-100 TABLET ORAL EVERY 6 HOURS PRN
Qty: 40 TABLET | Refills: 0 | Status: SHIPPED | OUTPATIENT
Start: 2023-11-25 | End: 2023-11-28 | Stop reason: SDUPTHER

## 2023-11-25 RX ORDER — OXYCODONE HYDROCHLORIDE 5 MG/1
5-10 TABLET ORAL EVERY 6 HOURS PRN
Qty: 40 TABLET | Refills: 0 | Status: SHIPPED | OUTPATIENT
Start: 2023-11-25 | End: 2023-11-28 | Stop reason: SDUPTHER

## 2023-11-25 NOTE — PROGRESS NOTES
Diagnosis: End stage osteoarthritis Right Knee  Procedure Performed: Right Total Knee Arthroplasty   Date of Surgery: October 6, 2023    Subjective:  Migue Adair is here for regularly scheduled follow-up after the above procedure. The patient has no specific complaints other than occasional discomfort. The patient is using oxycodone, tramadol and Tylenol for pain control.  The patient  has been compliant with DENNISE compression stocking as prescribed. They denies been working with home physical therapy and has not progressed to outpatient PT. The patient is ambulatory with a cane. The patient denies: fevers, chills, incisional drainage, joint instability, chest or calf pain, shortness of breath, and night sweats. The patient has been compliant with their prescribed aspirin for VTE prophylaxis. There are no complaints of numbness or tingling in the operative extremity.     Physical Examination:   General: Patient is alert and oriented x 3 and appears comfortable.  Gait: Patient ambulates with slight antalgic gait.  Wound: Incision is well healed over the right knee. There is mild warmth and effusion about the knee, both consistent with the normal post-operative healing. There is no erythema, incisional drainage, fluctuance, or suture abscess.   Right Knee: ROM 10-90.  Stable to varus and valgus stress at 0 and 30 degrees.   Patella tracks midline  Expected post operative swelling and tenderness   Calf: No swelling or tenderness  Able to dorsi-flex and plantar-flex the ankle with appropriate strength. Able to wiggle all toes.   The foot is warm and well perfused with palpable pulses.   Sensation is intact to light touch.     Radiographs: AP, Merchant and Lateral Right Knee: Right total knee arthroplasty in place. There is no evidence of fracture or dislocation. The joint is located. Compared to immediate post-operative x-rays, no change in appearance. The patella is tracking centrally.     Assessment and Plan: Migue  Giovany is progressing well approximately 2 weeks s/p right total knee arthroplasty. I am satisfied with the patient's recovery to this point.     1. A thorough discussion was had with the patient concerning the postoperative course and the patient is in agreement with the plan.  2. Continued physical therapy with gait training to improve strength and stamina. Progress off support as tolerated.   3. Continue with current pain regimen. We discussed strategies to wean off of opioid medications as tolerated. Transition to tylenol. Can incorporate NSAIDs after completion of prophylactic anticoagulation. Due to the nature of the surgery and the necessary post-operative rehabilitation, the patient will require more than 30 morphine MEQ per day for 6 days. The patient occasionally patient rates his pain a 9 or 10 on the pain scale.  I have personally reviewed the OARRS report for this patient. This report is scanned into the electronic medical record. I have considered the risks of abuse, dependence, addiction and diversion.  4. Reviewed the need for prophylactic antibiotics prior to any dental or other invasive procedures.  5. No swimming or tub bathing until 3 months post op  6. Follow-up in 4 weeks with radiographs or sooner if there are any concerns.      ** This office note was dictated using Dragon voice to text software and was not proofread for spelling or grammatical errors *

## 2023-11-28 ENCOUNTER — APPOINTMENT (OUTPATIENT)
Dept: PHYSICAL THERAPY | Facility: CLINIC | Age: 58
End: 2023-11-28
Payer: COMMERCIAL

## 2023-11-28 ENCOUNTER — DOCUMENTATION (OUTPATIENT)
Dept: PHYSICAL THERAPY | Facility: CLINIC | Age: 58
End: 2023-11-28
Payer: COMMERCIAL

## 2023-11-28 DIAGNOSIS — Z96.651 AFTERCARE FOLLOWING RIGHT KNEE JOINT REPLACEMENT SURGERY: ICD-10-CM

## 2023-11-28 DIAGNOSIS — Z47.1 AFTERCARE FOLLOWING RIGHT KNEE JOINT REPLACEMENT SURGERY: ICD-10-CM

## 2023-11-28 RX ORDER — OXYCODONE HYDROCHLORIDE 5 MG/1
5-10 TABLET ORAL EVERY 6 HOURS PRN
Qty: 40 TABLET | Refills: 0 | Status: SHIPPED | OUTPATIENT
Start: 2023-11-28 | End: 2023-12-05

## 2023-11-28 RX ORDER — TRAMADOL HYDROCHLORIDE 50 MG/1
50-100 TABLET ORAL EVERY 6 HOURS PRN
Qty: 40 TABLET | Refills: 0 | Status: SHIPPED | OUTPATIENT
Start: 2023-11-28 | End: 2023-12-05

## 2023-11-28 NOTE — PROGRESS NOTES
Physical Therapy                 Therapy Communication Note    Patient Name: Migue Adair  MRN: [id number]  Today's Date: 11/28/2023     Discipline: Physical Therapy    Missed Visit Reason:  Canceled 2nd to weather.     Missed Time: Cancel    Comment:

## 2023-12-05 ENCOUNTER — TREATMENT (OUTPATIENT)
Dept: PHYSICAL THERAPY | Facility: CLINIC | Age: 58
End: 2023-12-05
Payer: COMMERCIAL

## 2023-12-05 DIAGNOSIS — M25.561 RIGHT KNEE PAIN, UNSPECIFIED CHRONICITY: ICD-10-CM

## 2023-12-05 PROCEDURE — 97110 THERAPEUTIC EXERCISES: CPT | Mod: GP,CQ

## 2023-12-05 ASSESSMENT — PAIN - FUNCTIONAL ASSESSMENT: PAIN_FUNCTIONAL_ASSESSMENT: 0-10

## 2023-12-05 ASSESSMENT — PAIN DESCRIPTION - DESCRIPTORS: DESCRIPTORS: ACHING

## 2023-12-05 ASSESSMENT — PAIN SCALES - GENERAL: PAINLEVEL_OUTOF10: 5 - MODERATE PAIN

## 2023-12-05 NOTE — PROGRESS NOTES
Physical Therapy    Physical Therapy Treatment    Patient Name: Migue Adair  MRN: [id number]  Today's Date: 12/5/2023  Time Calculation  Start Time: 0315  Stop Time: 0400  Time Calculation (min): 45 min  Visit #4    Assessment:   PT Assessment  Evaluation/Treatment Tolerance: Patient tolerated treatment well  Tolerated treatment today well. Demonstrates less pain when performing both quad sets and heel slides compared to when last seen by this therapist; Improved flexion ROM with heel slides. Felt his given hamstring curl with step tap exercise was a good exercise today. Reviewed parts of his HEP and increasing amounts of flexion base exercises and different ways to get extension while sitting at home. NO other complaints currently.     Plan: Continue building strength, knee ROM, functional mobility endurance and gait corrections, as tolerable. Modalities for pain management, as needed.     Current Problem  1. Right knee pain, unspecified chronicity  Follow Up In Physical Therapy        Subjective   Arrives to therapy with a moderate pain level of 5/10 through the right knee, took pain medication prior to arriving to today's treatment session. HEP has been going well, no complaints with current given exercises. Will be seeing his doctor tomorrow for his left knee recently being fatigue and buckling. No other complaints currently.     General     Precautions; WBAT     Vital Signs; NA     Pain  Pain Assessment: 0-10  Pain Score: 5 - Moderate pain  Pain Type: Acute pain  Pain Location: Knee  Pain Orientation: Right  Pain Descriptors: Aching  Pain Frequency: Constant/continuous    Objective; NA     Posture  WNL upper body. Right knee with tendency to rest in standing without terminal knee extension  Outcome Measures:  LEFS = 15/80    Treatments:  SciFit Bike (seat R 14) X 10 minutes  Knee flexion on step x 20  Stair calf stretch x 1 min  Stair hamstring stretch 2 x 20 sec  Heel slides with strap x 20  Heel lifts with  knees straights x 20  SLRs x 20 (quad set)  Short 4 inch step ups, leading with right foot (quad set at top)  Hamstring curl toe tap second step x 20    OP EDUCATION:     Goals:  Active       PT Problem       PT Goal 1       Start:  10/31/23    Expected End:  12/30/23       Decrease pain to <4/10 50% of the time to demonstrate improved overall QoL.          PT Goal 2       Start:  10/31/23    Expected End:  12/30/23       Patient R knee extension ROM will improve by 5 degrees or more for functional ambulation.          PT Goal 3       Start:  10/31/23    Expected End:  12/30/23       Pt R knee flexion will improve from 89 to 120 or equal to L knee flexion for functional ambulation.          PT Goal 4       Start:  10/31/23    Expected End:  12/30/23       Pt will improve R hip flexion, abduction, adduction, and extension strength by 1 grade or more demonstrating improvements with daily activities.          PT Goal 5       Start:  10/31/23    Expected End:  12/30/23       Pt will be indep with HEP and instructed functional activities.             PT Problem       Patient Stated Goal 1       Start:  10/31/23    Expected End:  12/30/23       Pt will improve gait mechanics displaying proper knee extension, heel strike, and toe off with no assistive device.

## 2023-12-06 ENCOUNTER — ANCILLARY PROCEDURE (OUTPATIENT)
Dept: RADIOLOGY | Facility: CLINIC | Age: 58
End: 2023-12-06
Payer: COMMERCIAL

## 2023-12-06 ENCOUNTER — OFFICE VISIT (OUTPATIENT)
Dept: ORTHOPEDIC SURGERY | Facility: CLINIC | Age: 58
End: 2023-12-06
Payer: COMMERCIAL

## 2023-12-06 VITALS — BODY MASS INDEX: 33.37 KG/M2 | HEIGHT: 74 IN | WEIGHT: 260 LBS

## 2023-12-06 DIAGNOSIS — M17.0 PRIMARY LOCALIZED OSTEOARTHRITIS OF BOTH KNEES: ICD-10-CM

## 2023-12-06 PROCEDURE — 3044F HG A1C LEVEL LT 7.0%: CPT | Performed by: STUDENT IN AN ORGANIZED HEALTH CARE EDUCATION/TRAINING PROGRAM

## 2023-12-06 PROCEDURE — 99024 POSTOP FOLLOW-UP VISIT: CPT | Performed by: STUDENT IN AN ORGANIZED HEALTH CARE EDUCATION/TRAINING PROGRAM

## 2023-12-06 PROCEDURE — 73562 X-RAY EXAM OF KNEE 3: CPT | Mod: RT

## 2023-12-06 PROCEDURE — 73562 X-RAY EXAM OF KNEE 3: CPT | Mod: RIGHT SIDE | Performed by: RADIOLOGY

## 2023-12-06 PROCEDURE — 1036F TOBACCO NON-USER: CPT | Performed by: STUDENT IN AN ORGANIZED HEALTH CARE EDUCATION/TRAINING PROGRAM

## 2023-12-06 ASSESSMENT — PAIN - FUNCTIONAL ASSESSMENT: PAIN_FUNCTIONAL_ASSESSMENT: 0-10

## 2023-12-06 ASSESSMENT — PAIN SCALES - GENERAL: PAINLEVEL_OUTOF10: 5 - MODERATE PAIN

## 2023-12-06 NOTE — PROGRESS NOTES
Diagnosis: End stage osteoarthritis Right Knee  Procedure Performed: Right Total Knee Arthroplasty   Date of Surgery: October 6, 2023    Subjective:  Migue Adair is here for regularly scheduled follow-up after the above procedure. The patient has no specific complaints other than occasional discomfort.  He will use tramadol occasionally for pain control.  Sometimes at night.  He is ambulatory with a cane.  The patient denies: fevers, chills, incisional drainage, joint instability, chest or calf pain, shortness of breath, and night sweats.  He is working with outpatient physical therapy.  No falls or trauma.    Migue is complaining of increased left knee pain and instability.  He has been using the knee brace on his left side.    There has been no interval change in this patient's past medical, surgical, medications, allergies, family history or social history since the most recent visit to a provider within our department.  14 point review of systems was performed, reviewed, and negative except for pertinent positives documented in the history of present illness.    Physical Examination:   General: Patient is alert and oriented x 3 and appears comfortable.  Gait: Patient ambulates with slight antalgic gait.  Wound: Incision is well healed over the right knee. There is mild warmth and effusion about the knee, both consistent with the normal post-operative healing. There is no erythema, incisional drainage, fluctuance, or suture abscess.   Right Knee: ROM 0-95  Stable to varus and valgus stress at 0 and 30 degrees.   Patella tracks midline  Expected post operative swelling and tenderness   Calf: No swelling or tenderness  Able to dorsi-flex and plantar-flex the ankle with appropriate strength. Able to wiggle all toes.   The foot is warm and well perfused with palpable pulses.   Sensation is intact to light touch.     Radiographs: AP, Merchant and Lateral Right Knee: Right total knee arthroplasty in place. There is no  evidence of fracture or dislocation. The joint is located. Compared to immediate post-operative x-rays, no change in appearance. The patella is tracking centrally.     Assessment and Plan: Migue Adair is progressing well approximately 6 weeks s/p right total knee arthroplasty.  I am very concerned about the patient's lack of flexion.  I discussed with the patient that if he is unable to make significant improvements in his range of motion, we may be forced to perform a closed manipulation under anesthesia.  I have reached out to our physical therapy team.  They will work with him diligently.  I did encourage him to take medications for pain as needed.  Preoperative range of motion was recorded as 105 degrees of flexion.    With respect to the left knee, the patient does have previous radiographs demonstrating a valgus deformity with moderate to severe degenerative changes.  I recommended the patient maintain his brace.  I recommend that he exercise caution going up and down ladders or climbing.  He is at high risk of falls.  The patient should wear his brace while at work at all times.  He can remove the brace at home.    1. A thorough discussion was had with the patient concerning the postoperative course and the patient is in agreement with the plan.  2. Continued physical therapy with gait training to improve strength and stamina. Progress off support as tolerated.   3. Continue with current pain regimen. We discussed strategies to wean off of opioid medications as tolerated.   4. Reviewed the need for prophylactic antibiotics prior to any dental or other invasive procedures.  5. No swimming or tub bathing until 3 months post op  6. Follow-up in 2 weeks without radiographs for range of motion check.      ** This office note was dictated using Dragon voice to text software and was not proofread for spelling or grammatical errors *

## 2023-12-11 ENCOUNTER — TELEPHONE (OUTPATIENT)
Dept: PRIMARY CARE | Facility: CLINIC | Age: 58
End: 2023-12-11
Payer: COMMERCIAL

## 2023-12-12 ENCOUNTER — TREATMENT (OUTPATIENT)
Dept: PHYSICAL THERAPY | Facility: CLINIC | Age: 58
End: 2023-12-12
Payer: COMMERCIAL

## 2023-12-12 DIAGNOSIS — M25.561 RIGHT KNEE PAIN, UNSPECIFIED CHRONICITY: ICD-10-CM

## 2023-12-12 PROCEDURE — 97110 THERAPEUTIC EXERCISES: CPT | Mod: GP,CQ

## 2023-12-12 PROCEDURE — 97140 MANUAL THERAPY 1/> REGIONS: CPT | Mod: GP,CQ

## 2023-12-12 ASSESSMENT — PAIN DESCRIPTION - DESCRIPTORS: DESCRIPTORS: ACHING

## 2023-12-12 ASSESSMENT — PAIN SCALES - GENERAL: PAINLEVEL_OUTOF10: 0 - NO PAIN

## 2023-12-12 ASSESSMENT — PAIN - FUNCTIONAL ASSESSMENT: PAIN_FUNCTIONAL_ASSESSMENT: 0-10

## 2023-12-12 NOTE — PROGRESS NOTES
Physical Therapy    Physical Therapy Treatment    Patient Name: Migue Adair  MRN: [id number]  Today's Date: 12/12/2023  Time Calculation  Start Time: 0230  Stop Time: 0315  Time Calculation (min): 45 min  Visit #5    Assessment:   PT Assessment  Evaluation/Treatment Tolerance: Patient tolerated treatment well  Focused today's treatment on manual ROM to the right knee. Measured Right knee flexion today at 110 degrees with therapist overpressure; tolerable pain levels through out, breaks given. Finished session with exercises on the stairs, no complaints during. Changed lunge on stair to have foot on third step, improved motion and form with increased height.     Plan: Continue building strength, knee ROM, functional mobility endurance and gait corrections, as tolerable. Modalities for pain management, as needed.     Current Problem  1. Right knee pain, unspecified chronicity  Follow Up In Physical Therapy        Subjective   Starts therapy off with reports of soreness through the right knee, no current pain level. Has been compliant with HEP daily and specifically his heel slides to further improve his flexion ROM. Took a pain pill before his session today to help tolerate manual work. No other complaints at this point in time.     General     Precautions; WBAT     Vital Signs; NA     Pain  Pain Assessment: 0-10  Pain Score: 0 - No pain  Pain Type: Acute pain  Pain Location: Knee  Pain Orientation: Right  Pain Descriptors: Aching  Pain Frequency: Constant/continuous    Objective; NA     Posture  WNL upper body. Right knee with tendency to rest in standing without terminal knee extension  Outcome Measures:  LEFS = 15/80    Treatments: x 20 min  SciFit Bike (seat 7) X 10 min  Knee flexion on step x 20  Stair calf stretch with quad set for knee extension x 1 min  Stair hamstring stretch 2 x 20 sec    Manual:  X 25 minutes  Passive ROM flx to right knee    (Not today)  Heel slides with strap x 20  Heel lifts with knees  straights x 20  SLRs x 20 (quad set)  Short 4 inch step ups, leading with right foot (quad set at top)  Hamstring curl toe tap second step x 20    OP EDUCATION:     Goals:  Active       PT Problem       PT Goal 1       Start:  10/31/23    Expected End:  12/30/23       Decrease pain to <4/10 50% of the time to demonstrate improved overall QoL.          PT Goal 2       Start:  10/31/23    Expected End:  12/30/23       Patient R knee extension ROM will improve by 5 degrees or more for functional ambulation.          PT Goal 3       Start:  10/31/23    Expected End:  12/30/23       Pt R knee flexion will improve from 89 to 120 or equal to L knee flexion for functional ambulation.          PT Goal 4       Start:  10/31/23    Expected End:  12/30/23       Pt will improve R hip flexion, abduction, adduction, and extension strength by 1 grade or more demonstrating improvements with daily activities.          PT Goal 5       Start:  10/31/23    Expected End:  12/30/23       Pt will be indep with HEP and instructed functional activities.             PT Problem       Patient Stated Goal 1       Start:  10/31/23    Expected End:  12/30/23       Pt will improve gait mechanics displaying proper knee extension, heel strike, and toe off with no assistive device.

## 2023-12-14 ENCOUNTER — OFFICE VISIT (OUTPATIENT)
Dept: ENDOCRINOLOGY | Facility: CLINIC | Age: 58
End: 2023-12-14
Payer: COMMERCIAL

## 2023-12-14 VITALS — SYSTOLIC BLOOD PRESSURE: 132 MMHG | BODY MASS INDEX: 31.58 KG/M2 | DIASTOLIC BLOOD PRESSURE: 74 MMHG | WEIGHT: 246 LBS

## 2023-12-14 DIAGNOSIS — E11.9 TYPE 2 DIABETES MELLITUS WITHOUT COMPLICATION, WITHOUT LONG-TERM CURRENT USE OF INSULIN (MULTI): Primary | ICD-10-CM

## 2023-12-14 DIAGNOSIS — E11.9 TYPE 2 DIABETES MELLITUS WITHOUT COMPLICATION, WITH LONG-TERM CURRENT USE OF INSULIN (MULTI): ICD-10-CM

## 2023-12-14 DIAGNOSIS — Z79.4 TYPE 2 DIABETES MELLITUS WITHOUT COMPLICATION, WITH LONG-TERM CURRENT USE OF INSULIN (MULTI): ICD-10-CM

## 2023-12-14 PROCEDURE — 3075F SYST BP GE 130 - 139MM HG: CPT | Performed by: INTERNAL MEDICINE

## 2023-12-14 PROCEDURE — 1036F TOBACCO NON-USER: CPT | Performed by: INTERNAL MEDICINE

## 2023-12-14 PROCEDURE — 99214 OFFICE O/P EST MOD 30 MIN: CPT | Performed by: INTERNAL MEDICINE

## 2023-12-14 PROCEDURE — 3078F DIAST BP <80 MM HG: CPT | Performed by: INTERNAL MEDICINE

## 2023-12-14 PROCEDURE — 3044F HG A1C LEVEL LT 7.0%: CPT | Performed by: INTERNAL MEDICINE

## 2023-12-14 RX ORDER — FLASH GLUCOSE SENSOR
KIT MISCELLANEOUS
Qty: 6 EACH | Refills: 3 | Status: SHIPPED | OUTPATIENT
Start: 2023-12-14

## 2023-12-14 RX ORDER — METFORMIN HYDROCHLORIDE 1000 MG/1
1000 TABLET ORAL 2 TIMES DAILY
Qty: 180 TABLET | Refills: 3 | Status: SHIPPED | OUTPATIENT
Start: 2023-12-14

## 2023-12-14 RX ORDER — DULAGLUTIDE 1.5 MG/.5ML
1.5 INJECTION, SOLUTION SUBCUTANEOUS
Qty: 6 ML | Refills: 3 | Status: SHIPPED | OUTPATIENT
Start: 2023-12-14 | End: 2024-04-18 | Stop reason: ALTCHOICE

## 2023-12-14 RX ORDER — GLIPIZIDE 5 MG/1
5 TABLET ORAL DAILY
Qty: 90 TABLET | Refills: 3 | Status: SHIPPED | OUTPATIENT
Start: 2023-12-14

## 2023-12-14 ASSESSMENT — ENCOUNTER SYMPTOMS
FEVER: 0
DIARRHEA: 0
CHILLS: 0
SHORTNESS OF BREATH: 0
VOMITING: 0
DIZZINESS: 0
NAUSEA: 0
LIGHT-HEADEDNESS: 0

## 2023-12-14 NOTE — PROGRESS NOTES
Endocrinology: Follow up visit  Subjective   Patient ID: Migue Adair is a 58 y.o. male who presents for Follow-up (4 month).dm2    PCP: Tor Nettles,       HPI  Since last visit had tkr in oct.  Rehabbing slowly.  Stiff.  Reports sugars under 100.  Doing great with trulicity.   Feels ok beyond knee pain,  due for eye exam  Sglt2: refused due to hx groin infections    Review of Systems   Constitutional:  Negative for chills and fever.   Respiratory:  Negative for shortness of breath.    Gastrointestinal:  Negative for diarrhea, nausea and vomiting.   Endocrine: Negative for cold intolerance and heat intolerance.   Neurological:  Negative for dizziness and light-headedness.       Patient Active Problem List   Diagnosis    Arm mass    Benign essential hypertension    Bilateral sensorineural hearing loss    Chronic headaches    GERD (gastroesophageal reflux disease)    Hearing loss    Hyperlipidemia    Hypermetropia of both eyes    Lesion of vocal cord    Lipoma of right upper extremity    Myopia of both eyes    Neuropathy    Obstructive sleep apnea, adult    Optic nerve cupping of both eyes    Organic impotence    Primary localized osteoarthritis of both knees    Pulmonary nodules    Skin lump of arm    HTN (hypertension)    Diabetes mellitus, type 2 (CMS/HCC)    Obesity    TORSTEN (obstructive sleep apnea)    Peripheral neuropathy    Right knee pain        Home Meds:  Current Outpatient Medications   Medication Instructions    acetaminophen (Tylenol) 500 mg tablet oral    atorvastatin (LIPITOR) 10 mg, oral, Daily    chlorhexidine (Peridex) 0.12 % solution     dilTIAZem CD (CARDIZEM CD) 240 mg, oral, Daily    docusate sodium (COLACE) 100 mg, oral, 2 times daily    FreeStyle Tesfaye 14 Day Sensor kit Use as directed    gabapentin (NEURONTIN) 400 mg, oral, 3 times daily, 1 capsule po in the am and 1 capsule in the afternoon and 2 capsules before bedtime.    glipiZIDE (GLUCOTROL) 5 mg, oral, Daily    glyburide-metformin  "(Glucovance) 2.5-500 mg tablet 2 tablets, oral, 2 times daily    ibuprofen 600 mg, oral, Every 6 hours PRN    metFORMIN (GLUCOPHAGE) 1,000 mg, oral, 2 times daily    omeprazole (PriLOSEC) 20 mg DR capsule 1 capsule, oral, Daily    ondansetron (ZOFRAN) 4 mg, oral, Every 8 hours PRN    senna 8.6 mg, oral, Daily    Trulicity 1.5 mg/0.5 mL pen injector subcutaneous    Trulicity 1.5 mg, subcutaneous, Weekly    Voltaren 1 % gel gel APPLY SPARINGLY EXTERNALLY TO THE AFFECTED AREA EVERY DAY        No Known Allergies     Objective   Vitals:    12/14/23 1341   BP: 132/74      Vitals:    12/14/23 1341   Weight: 112 kg (246 lb)      Body mass index is 31.58 kg/m².   Physical Exam  Constitutional:       Appearance: Normal appearance. He is overweight.   HENT:      Head: Normocephalic and atraumatic.   Neck:      Thyroid: No thyroid mass, thyromegaly or thyroid tenderness.   Cardiovascular:      Rate and Rhythm: Normal rate and regular rhythm.      Heart sounds: No murmur heard.     No gallop.   Pulmonary:      Effort: Pulmonary effort is normal.      Breath sounds: Normal breath sounds.   Abdominal:      Palpations: Abdomen is soft.      Comments: benign   Neurological:      General: No focal deficit present.      Mental Status: He is alert and oriented to person, place, and time.      Deep Tendon Reflexes: Reflexes are normal and symmetric.   Psychiatric:         Behavior: Behavior is cooperative.         Labs:  Lab Results   Component Value Date    HGBA1C 6.9 (A) 05/01/2023    TSH 2.25 07/20/2023    FREET4 0.81 07/20/2023      No results found for: \"PR1\", \"THYROIDPAB\", \"TSI\"     Assessment/Plan   Problem List Items Addressed This Visit    None    Dm2:  Due for labs: ordered  Doing great with weight and sugars  Htn:  Bp decent  Lipids:  Continue statin  Reminded him to schedule eye exam  Follow up in 4 months  Electronically signed by:  Annamaria Alonso MD 12/14/23 2:12 PM              "

## 2023-12-14 NOTE — PATIENT INSTRUCTIONS
Keep up the good work with sugars  Labs when able  Follow up in 4 months  Call or message with concerns  Schedule eye exam

## 2023-12-19 ENCOUNTER — APPOINTMENT (OUTPATIENT)
Dept: PHYSICAL THERAPY | Facility: CLINIC | Age: 58
End: 2023-12-19
Payer: COMMERCIAL

## 2023-12-19 ENCOUNTER — TREATMENT (OUTPATIENT)
Dept: PHYSICAL THERAPY | Facility: CLINIC | Age: 58
End: 2023-12-19
Payer: COMMERCIAL

## 2023-12-19 ENCOUNTER — APPOINTMENT (OUTPATIENT)
Dept: ORTHOPEDIC SURGERY | Facility: CLINIC | Age: 58
End: 2023-12-19
Payer: COMMERCIAL

## 2023-12-19 DIAGNOSIS — M25.561 RIGHT KNEE PAIN, UNSPECIFIED CHRONICITY: Primary | ICD-10-CM

## 2023-12-19 PROCEDURE — 97110 THERAPEUTIC EXERCISES: CPT | Mod: GP,CQ

## 2023-12-19 PROCEDURE — 97140 MANUAL THERAPY 1/> REGIONS: CPT | Mod: GP,CQ

## 2023-12-19 ASSESSMENT — PAIN - FUNCTIONAL ASSESSMENT: PAIN_FUNCTIONAL_ASSESSMENT: 0-10

## 2023-12-19 ASSESSMENT — PAIN DESCRIPTION - DESCRIPTORS: DESCRIPTORS: ACHING

## 2023-12-19 ASSESSMENT — PAIN SCALES - GENERAL: PAINLEVEL_OUTOF10: 5 - MODERATE PAIN

## 2023-12-19 NOTE — PROGRESS NOTES
Physical Therapy    Physical Therapy Treatment    Patient Name: Migue Adair  MRN: [id number]  Today's Date: 12/19/2023  Time Calculation  Start Time: 0230  Stop Time: 0315  Time Calculation (min): 45 min  Visit #6    Assessment:   PT Assessment  Evaluation/Treatment Tolerance: Patient tolerated treatment well  Tolerated today's treatment session fair today as his pain was slightly more during manual ROM with over pressure, measured around 111 degrees of flexion when finishing today's session. Has improved ability to make full revolutions at a closer seat setting on the recumbent bike. Added quad sets to be performed while doing calf stretching and step ups. Continue working on flexion and extension ROM.     Plan: Continue building strength, knee ROM, functional mobility endurance and gait corrections, as tolerable. Modalities for pain management, as needed.     Current Problem  1. Right knee pain, unspecified chronicity            Subjective   Patient arrives today being well secondary to taking pain medication before his session, earlier this morning was in around 8/10 pain. Has been riding a stationary bike while at home recently to help increase his right knee flexion. Continues to perform HEP every other day. No major updates or concerns at this time.     General     Precautions; WBAT     Vital Signs; NA     Pain  Pain Assessment: 0-10  Pain Score: 5 - Moderate pain  Pain Type: Acute pain  Pain Location: Knee  Pain Orientation: Right  Pain Descriptors: Aching  Pain Frequency: Constant/continuous    Objective; NA     Posture  WNL upper body. Right knee with tendency to rest in standing without terminal knee extension  Outcome Measures:  LEFS = 15/80    Treatments: x 20 min  SciFit Bike (seat 7) X 10 min  Knee flexion on step x 20  Stair calf stretch with quad set for knee extension x 1 min (quad set while holding stretch)  Stair hamstring stretch 2 x 20 sec  Short 4 inch step ups, leading with right foot (quad set  at top)    Manual:  X 25 minutes  Passive ROM flx/ext to right knee    (Not today)  Heel slides with strap x 20  Heel lifts with knees straights x 20  SLRs x 20 (quad set)  Hamstring curl toe tap second step x 20    OP EDUCATION:     Goals:  Active       PT Problem       PT Goal 1       Start:  10/31/23    Expected End:  12/30/23       Decrease pain to <4/10 50% of the time to demonstrate improved overall QoL.          PT Goal 2       Start:  10/31/23    Expected End:  12/30/23       Patient R knee extension ROM will improve by 5 degrees or more for functional ambulation.          PT Goal 3       Start:  10/31/23    Expected End:  12/30/23       Pt R knee flexion will improve from 89 to 120 or equal to L knee flexion for functional ambulation.          PT Goal 4       Start:  10/31/23    Expected End:  12/30/23       Pt will improve R hip flexion, abduction, adduction, and extension strength by 1 grade or more demonstrating improvements with daily activities.          PT Goal 5       Start:  10/31/23    Expected End:  12/30/23       Pt will be indep with HEP and instructed functional activities.             PT Problem       Patient Stated Goal 1       Start:  10/31/23    Expected End:  12/30/23       Pt will improve gait mechanics displaying proper knee extension, heel strike, and toe off with no assistive device.

## 2023-12-21 DIAGNOSIS — I10 BENIGN ESSENTIAL HYPERTENSION: ICD-10-CM

## 2023-12-21 RX ORDER — DILTIAZEM HYDROCHLORIDE 240 MG/1
CAPSULE, COATED, EXTENDED RELEASE ORAL
Qty: 90 CAPSULE | Refills: 1 | Status: SHIPPED | OUTPATIENT
Start: 2023-12-21

## 2023-12-26 ENCOUNTER — APPOINTMENT (OUTPATIENT)
Dept: PHYSICAL THERAPY | Facility: CLINIC | Age: 58
End: 2023-12-26
Payer: COMMERCIAL

## 2023-12-26 ENCOUNTER — OFFICE VISIT (OUTPATIENT)
Dept: ORTHOPEDIC SURGERY | Facility: CLINIC | Age: 58
End: 2023-12-26
Payer: COMMERCIAL

## 2023-12-26 VITALS — BODY MASS INDEX: 31.06 KG/M2 | WEIGHT: 242 LBS | HEIGHT: 74 IN

## 2023-12-26 DIAGNOSIS — Z47.1 AFTERCARE FOLLOWING RIGHT KNEE JOINT REPLACEMENT SURGERY: Primary | ICD-10-CM

## 2023-12-26 DIAGNOSIS — Z96.651 AFTERCARE FOLLOWING RIGHT KNEE JOINT REPLACEMENT SURGERY: Primary | ICD-10-CM

## 2023-12-26 DIAGNOSIS — M24.661 ARTHROFIBROSIS OF KNEE JOINT, RIGHT: ICD-10-CM

## 2023-12-26 PROCEDURE — 99024 POSTOP FOLLOW-UP VISIT: CPT | Performed by: STUDENT IN AN ORGANIZED HEALTH CARE EDUCATION/TRAINING PROGRAM

## 2023-12-26 PROCEDURE — 1036F TOBACCO NON-USER: CPT | Performed by: STUDENT IN AN ORGANIZED HEALTH CARE EDUCATION/TRAINING PROGRAM

## 2023-12-26 PROCEDURE — 3044F HG A1C LEVEL LT 7.0%: CPT | Performed by: STUDENT IN AN ORGANIZED HEALTH CARE EDUCATION/TRAINING PROGRAM

## 2023-12-26 ASSESSMENT — PAIN - FUNCTIONAL ASSESSMENT: PAIN_FUNCTIONAL_ASSESSMENT: NO/DENIES PAIN

## 2023-12-26 NOTE — PROGRESS NOTES
Diagnosis: End stage osteoarthritis Right Knee  Procedure Performed: Right Total Knee Arthroplasty   Date of Surgery: October 6, 2023    Subjective:  Migue Adair is here for regularly scheduled follow-up after the above procedure.  The patient continues to make progress with physical therapy.  At his last visit, he was able to achieve 111 degrees of flexion after his first session.  He still ambulatory with a cane.  He is using opioid medications as needed.  His pain is mostly at night.  Otherwise, he has very little pain.  He denies fall or trauma.  No drainage.    There has been no interval change in this patient's past medical, surgical, medications, allergies, family history or social history since the most recent visit to a provider within our department.  14 point review of systems was performed, reviewed, and negative except for pertinent positives documented in the history of present illness.    Physical Examination:   General: Patient is alert and oriented x 3 and appears comfortable.  Gait: Patient ambulates with slight antalgic gait.  Wound: Incision is well healed over the right knee. There is mild warmth and effusion about the knee, both consistent with the normal post-operative healing. There is no erythema, incisional drainage, fluctuance, or suture abscess.   Right Knee: ROM 0-100  Stable to varus and valgus stress at 0 and 30 degrees.   Patella tracks midline  Expected post operative swelling and tenderness   Calf: No swelling or tenderness  Able to dorsi-flex and plantar-flex the ankle with appropriate strength. Able to wiggle all toes.   The foot is warm and well perfused with palpable pulses.   Sensation is intact to light touch.     Radiographs: AP, Merchant and Lateral Right Knee: Right total knee arthroplasty in place. There is no evidence of fracture or dislocation. The joint is located. Compared to immediate post-operative x-rays, no change in appearance. The patella is tracking centrally.      Assessment and Plan: Migue Adair is progressing well approximately 11 weeks s/p right total knee arthroplasty.  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    We discussed both close ambulation under anesthesia and arthroscopic lysis of adhesions with subsequent manipulation under anesthesia.  The patient's preoperative range of motion was only to 105.  The patient is exceeding that in therapy.  I think it is reasonable to defer closing of irrigation under anesthesia at this juncture.  Should he wish to move forward with procedure to improve his range of motion afterwards, we can consider an arthroscopic lysis of adhesions with subsequent manipulation under anesthesia.    Patient will continue to work with physical therapy.  I believe it is medically necessary for the patient to continue working with physical therapy to address his arthrofibrosis.  I have ordered an additional 2 months of physical therapy.    I will see the patient back in 5 weeks for follow-up.  Please obtain a bone length scan at that time.    *This note was created using voice recognition software and was not corrected for typographical or grammatical errors.*

## 2024-01-02 ENCOUNTER — TREATMENT (OUTPATIENT)
Dept: PHYSICAL THERAPY | Facility: CLINIC | Age: 59
End: 2024-01-02
Payer: COMMERCIAL

## 2024-01-02 DIAGNOSIS — M25.561 RIGHT KNEE PAIN, UNSPECIFIED CHRONICITY: Primary | ICD-10-CM

## 2024-01-02 PROCEDURE — 97110 THERAPEUTIC EXERCISES: CPT | Mod: GP

## 2024-01-02 NOTE — PROGRESS NOTES
Physical Therapy    Physical Therapy Treatment    Patient Name: Migue Adair  MRN: [id number]  Today's Date: 1/2/2024     Visit #7    Assessment:      Tolerated today's treatment session fair today as his pain was slightly more during manual ROM with over pressure, measured around 111 degrees of flexion when finishing today's session. Has improved ability to make full revolutions at a closer seat setting on the recumbent bike. Added quad sets to be performed while doing calf stretching and step ups. Continue working on flexion and extension ROM.     Plan: Continue building strength, knee ROM, functional mobility endurance and gait corrections, as tolerable. Modalities for pain management, as needed.     Current Problem  1. Right knee pain, unspecified chronicity            Subjective   Spoke with Dr Michelle and saw him recently. He approved me for four more visits. I had visit with him o 12-.    General     Precautions; WBAT     Vital Signs; NA     Pain; 6/10 knee pain. I took pill early this morning. I woke up with 8/10 knee pain       Objective;   Right knee extension lacking 8 degrees  Right knee flexion AROM 95  PROM 110 (antalgic end feel in flexion, springy end feel in extension)  Reciprocal ascending/descending requires assist of rail     Posture  WNL upper body. Right knee with tendency to rest in standing without terminal knee extension  Outcome Measures:  LEFS = 15/80    Treatments: x 20 min  SciFit Bike (seat 7) X 12 min  Knee flexion on step x 20  Stair calf stretch with quad set for knee extension x 1 min (quad set while holding stretch)  Step up with right x 10  Standing heel lifts with knees straight  Side steps in parallel bars  Strap assisted knee flexion  Discussed alternative knee flexion stretch, by kneeling off edge of bed      OP EDUCATION: Educated patient on needed daily frequency for leg strengthening, ROM progression, and gait correction     Goals:  Active       PT Problem       PT Goal 1        Start:  10/31/23    Expected End:  12/30/23       Decrease pain to <4/10 50% of the time to demonstrate improved overall QoL.          PT Goal 2       Start:  10/31/23    Expected End:  12/30/23       Patient R knee extension ROM will improve by 5 degrees or more for functional ambulation.          PT Goal 3       Start:  10/31/23    Expected End:  12/30/23       Pt R knee flexion will improve from 89 to 120 or equal to L knee flexion for functional ambulation.          PT Goal 4       Start:  10/31/23    Expected End:  12/30/23       Pt will improve R hip flexion, abduction, adduction, and extension strength by 1 grade or more demonstrating improvements with daily activities.          PT Goal 5       Start:  10/31/23    Expected End:  12/30/23       Pt will be indep with HEP and instructed functional activities.             PT Problem       Patient Stated Goal 1       Start:  10/31/23    Expected End:  12/30/23       Pt will improve gait mechanics displaying proper knee extension, heel strike, and toe off with no assistive device.

## 2024-01-04 ENCOUNTER — TREATMENT (OUTPATIENT)
Dept: PHYSICAL THERAPY | Facility: CLINIC | Age: 59
End: 2024-01-04
Payer: COMMERCIAL

## 2024-01-04 DIAGNOSIS — M25.561 CHRONIC PAIN OF RIGHT KNEE: Primary | ICD-10-CM

## 2024-01-04 DIAGNOSIS — G89.29 CHRONIC PAIN OF RIGHT KNEE: Primary | ICD-10-CM

## 2024-01-04 DIAGNOSIS — M25.561 RIGHT KNEE PAIN, UNSPECIFIED CHRONICITY: ICD-10-CM

## 2024-01-04 PROCEDURE — 97110 THERAPEUTIC EXERCISES: CPT | Mod: GP

## 2024-01-04 PROCEDURE — 97140 MANUAL THERAPY 1/> REGIONS: CPT | Mod: GP

## 2024-01-04 NOTE — PROGRESS NOTES
Physical Therapy    Physical Therapy Treatment    Patient Name: Migue Adair  MRN: [id number]  Today's Date: 1/4/2024     Visit #8    Assessment: Right knee ROM progression slow. Pain seems to block more significant ROM and Quads strength gain.  Patient would benefit from increasing force with ROM exercises at home or at a local gym. Several ideas demonstrated in effort to educate patient on different strategies for ROM and strength progression. Further visits may be beneficial with understanding that greater force has to be utilized to stretch through scar formation.        Plan: Continue building strength, knee ROM, functional mobility endurance and gait corrections, as tolerable. Modalities for pain management, as needed.     Current Problem  1. Chronic pain of right knee            Subjective   Spoke with Dr Michelle and saw him recently. He approved me for four more visits. I had visit with him on 12-.    General     Precautions; WBAT     Vital Signs; NA     Pain; 6/10 knee pain. I took pill early this morning. I woke up with 8/10 knee pain       Objective;   Right knee extension lacking 8 degrees  Right knee flexion AROM 95  PROM 110 (antalgic end feel in flexion, springy end feel in extension)  Reciprocal ascending/descending requires assist of rail     Posture  WNL upper body. Right knee with tendency to rest in standing without terminal knee extension  Outcome Measures:  LEFS = 15/80    Treatments: x 20 min  SciFit Bike (seat 7) X 12 min  Knee flexion on step x 20  Standing Hamstrings curls with flexion overpressure through foot on chair seat  Single leg press with 50 Ibs resistance  Contract-Relax knee flexion mobilizatoin  Stair calf stretch with quad set for knee extension x 1 min (quad set while holding stretch)  Step up with right x 10  Strap assisted knee flexion  Discussed alternative knee flexion stretch, by kneeling off edge of bed      OP EDUCATION: Educated patient on needed daily frequency for  leg strengthening, ROM progression, and gait correction     Goals:  Active       PT Problem       PT Goal 1       Start:  10/31/23    Expected End:  12/30/23       Decrease pain to <4/10 50% of the time to demonstrate improved overall QoL.          PT Goal 2       Start:  10/31/23    Expected End:  12/30/23       Patient R knee extension ROM will improve by 5 degrees or more for functional ambulation.          PT Goal 3       Start:  10/31/23    Expected End:  12/30/23       Pt R knee flexion will improve from 89 to 120 or equal to L knee flexion for functional ambulation.          PT Goal 4       Start:  10/31/23    Expected End:  12/30/23       Pt will improve R hip flexion, abduction, adduction, and extension strength by 1 grade or more demonstrating improvements with daily activities.          PT Goal 5       Start:  10/31/23    Expected End:  12/30/23       Pt will be indep with HEP and instructed functional activities.             PT Problem       Patient Stated Goal 1       Start:  10/31/23    Expected End:  12/30/23       Pt will improve gait mechanics displaying proper knee extension, heel strike, and toe off with no assistive device.

## 2024-01-09 DIAGNOSIS — Z47.1 AFTERCARE FOLLOWING RIGHT KNEE JOINT REPLACEMENT SURGERY: Primary | ICD-10-CM

## 2024-01-09 DIAGNOSIS — Z96.651 AFTERCARE FOLLOWING RIGHT KNEE JOINT REPLACEMENT SURGERY: Primary | ICD-10-CM

## 2024-01-09 RX ORDER — TRAMADOL HYDROCHLORIDE 50 MG/1
50-100 TABLET ORAL EVERY 6 HOURS PRN
Qty: 40 TABLET | Refills: 0 | Status: SHIPPED | OUTPATIENT
Start: 2024-01-09 | End: 2024-01-16

## 2024-01-10 ENCOUNTER — OFFICE VISIT (OUTPATIENT)
Dept: PRIMARY CARE | Facility: CLINIC | Age: 59
End: 2024-01-10
Payer: COMMERCIAL

## 2024-01-10 VITALS
BODY MASS INDEX: 31.06 KG/M2 | SYSTOLIC BLOOD PRESSURE: 124 MMHG | HEART RATE: 101 BPM | OXYGEN SATURATION: 98 % | DIASTOLIC BLOOD PRESSURE: 80 MMHG | WEIGHT: 242 LBS | HEIGHT: 74 IN

## 2024-01-10 DIAGNOSIS — E78.5 HYPERLIPIDEMIA, UNSPECIFIED HYPERLIPIDEMIA TYPE: ICD-10-CM

## 2024-01-10 DIAGNOSIS — G62.89 OTHER POLYNEUROPATHY: Primary | ICD-10-CM

## 2024-01-10 DIAGNOSIS — E11.9 TYPE 2 DIABETES MELLITUS WITHOUT COMPLICATION, WITHOUT LONG-TERM CURRENT USE OF INSULIN (MULTI): ICD-10-CM

## 2024-01-10 DIAGNOSIS — I10 BENIGN ESSENTIAL HYPERTENSION: ICD-10-CM

## 2024-01-10 DIAGNOSIS — M17.0 PRIMARY LOCALIZED OSTEOARTHRITIS OF BOTH KNEES: ICD-10-CM

## 2024-01-10 PROCEDURE — 99396 PREV VISIT EST AGE 40-64: CPT | Performed by: INTERNAL MEDICINE

## 2024-01-10 PROCEDURE — 3079F DIAST BP 80-89 MM HG: CPT | Performed by: INTERNAL MEDICINE

## 2024-01-10 PROCEDURE — 3074F SYST BP LT 130 MM HG: CPT | Performed by: INTERNAL MEDICINE

## 2024-01-10 PROCEDURE — 1036F TOBACCO NON-USER: CPT | Performed by: INTERNAL MEDICINE

## 2024-01-10 NOTE — PROGRESS NOTES
Subjective   Patient ID: Migue Adair is a 58 y.o. male who presents for physical.  HPI              old male with a PMHx of DM2, GERD, diabetic Neuropathy , status post right arm lipoma removal excision , pulmonary nodules 3 mm and severe right left knee osteoarthritis status post right total knee arthroplasty October 2023    Presents for follow-up he does have severe chronic knee pain for years right greater than left consistent grade 7 out of 10 he did have a right knee replacement recently still has a lot of pain there and swelling he does note some improvement with physical therapy and is doing  Left knee arthralgia persists worse when he stands on it he cannot walk or work or stand on it due to pain he is not wearing a left leg brace hinged  He has chronic neuropathy pins-and-needles that are worse with standing of the feet that are severe  Denies paralysis trauma          Health Maintenance:      Colonoscopy: 2022 Cologuard due 2025      Mammogram:      Pelvic/Pap:      Low dose chest CT:      Aorta duplex:      Optho:      Podiatry:        Vaccines:      Prevnar 20:      Prevnar 13:      Pneumovax 23:      Tdap:      Shingrix:      COVID:      Influenza:        ROS:      General: Intentional 18 pound weight loss denies fever/chills/weight loss      Head: denies HA/trauma/masses/dizziness      Eyes: denies vision change/loss of vision/blurry vision/diplopia/eye pain      Ears: denies hearing loss/tinnitus/otalgia/otorrhea      Nose: denies nasal drainage/anosmia      Throat: denies dysphagia/odynophagia      Lymphatics: denies lymph node swelling      Cardiac: denies CP/palpitations/orthopnea/PND      Pulmonary: denies dyspnea/cough/wheezing      GI: denies abd pain/n/v/diarrhea/melena/hematochezia/hematemesis      : denies dysuria/hematuria/change frequency      Genital: denies genital discharge/lesions      Skin: denies rashes/lesions/masses      MSK: Sometimes notices the left knee ronak less he wears  "the brace chronic right knee pain and left knee pain left seems worse since he is compensating more with it decreased range of motion of right knee since surgery getting somewhat better with physical therapy he is alternating with tramadol and oxycodone pain regimen through the orthopedics denies weakness/swelling/edema/gait imbalance/pain      Neuro: denies paresthesias/seizures/dysarthria      Psych: denies depression/anxiety/suicidal or homicidal ideations            Objective   /80   Pulse 101   Ht 1.88 m (6' 2\")   Wt 110 kg (242 lb)   SpO2 98%   BMI 31.07 kg/m²      Physical Exam:     General: AO3, NAD     Head: atraumatic/NC     Eyes: EOMI/PERRLA. Negative APD     Ears: TM pearly gray, EAC clear. No lesions or erythema     Nose: symmetric nares, no discharge     Throat: trachea midline, uvula midline pink mucosa. No thyromegaly     Lymphatics: no cervical/supraclavicular/ant or posterior cervical adenopathy/axillary/inguinal adenopathy     Breast: not examined     Chest: no deformity or tenderness to palpation     Pulm: CTA b/l, no wheeze/rhonchi/rales. nonlabored     Cardiac: RRR +s1s2, no m/r/g.      GI: soft, NT/ND. Normoactive Bsx4. No rebound/guarding.     Rectal: no examined     MSK: Right anterior patella scar clean dry intact mild edema of the patella decreased range of motion by about 15 degrees flexion extension of knee left knee hinged brace intact on his leg 5/5 strength UE LE. No edema/clubbing/cyanosis     Skin: no rashes/lesions     Vascular: 2+ palp DP PT radials b/l. Negative carotid bruit     Neuro: Decree sensation to pinprick bilateral feet up to the ankle CNII-XII intact. No focal deficits. Reflexes 2/4 brachioradialis bicep tricep patellar achilles. Finger to nose intact.     Psych: appropriate mood/affect                    No results found for: \"BMPR1A\", \"CBCDIF\"      Assessment/Plan   Diagnoses and all orders for this visit:  Other polyneuropathy  Type 2 diabetes mellitus " without complication, without long-term current use of insulin (CMS/[facility])  Primary localized osteoarthritis of both knees    As you stated your disability forms for the [employer] last through May and you will be sending me forms as needed which I am happy to do for medical reasons given your symptoms are unable to work at this time based on your knee arthritis decreased mobility and severe neuropathy of the feet    Call and follow-up with orthopedics recommendations noted physical therapy another 2 months may ultimately need lysis of adhesions endoscopically follow-up in 5 weeks    Endocrinology recommendations noted follow-up in 4 months    Screening blood work due July 2024    Thank you for making appointment today Migue    Please follow-up 6 months       Bianca Gregorio MA

## 2024-01-11 RX ORDER — ATORVASTATIN CALCIUM 10 MG/1
10 TABLET, FILM COATED ORAL DAILY
Qty: 90 TABLET | Refills: 1 | Status: SHIPPED | OUTPATIENT
Start: 2024-01-11

## 2024-01-23 ENCOUNTER — OFFICE VISIT (OUTPATIENT)
Dept: ORTHOPEDIC SURGERY | Facility: CLINIC | Age: 59
End: 2024-01-23
Payer: COMMERCIAL

## 2024-01-23 ENCOUNTER — TREATMENT (OUTPATIENT)
Dept: PHYSICAL THERAPY | Facility: CLINIC | Age: 59
End: 2024-01-23
Payer: COMMERCIAL

## 2024-01-23 ENCOUNTER — HOSPITAL ENCOUNTER (OUTPATIENT)
Dept: RADIOLOGY | Facility: CLINIC | Age: 59
Discharge: HOME | End: 2024-01-23
Payer: COMMERCIAL

## 2024-01-23 VITALS — WEIGHT: 247.3 LBS | HEIGHT: 74 IN | BODY MASS INDEX: 31.74 KG/M2

## 2024-01-23 DIAGNOSIS — M25.561 CHRONIC PAIN OF RIGHT KNEE: Primary | ICD-10-CM

## 2024-01-23 DIAGNOSIS — G89.29 CHRONIC PAIN OF RIGHT KNEE: Primary | ICD-10-CM

## 2024-01-23 DIAGNOSIS — Z47.1 AFTERCARE FOLLOWING RIGHT KNEE JOINT REPLACEMENT SURGERY: ICD-10-CM

## 2024-01-23 DIAGNOSIS — M25.561 RIGHT KNEE PAIN, UNSPECIFIED CHRONICITY: ICD-10-CM

## 2024-01-23 DIAGNOSIS — Z96.651 AFTERCARE FOLLOWING RIGHT KNEE JOINT REPLACEMENT SURGERY: ICD-10-CM

## 2024-01-23 DIAGNOSIS — M24.661 ARTHROFIBROSIS OF KNEE JOINT, RIGHT: Primary | ICD-10-CM

## 2024-01-23 PROCEDURE — 77073 BONE LENGTH STUDIES: CPT

## 2024-01-23 PROCEDURE — 73562 X-RAY EXAM OF KNEE 3: CPT | Mod: RT

## 2024-01-23 PROCEDURE — 3044F HG A1C LEVEL LT 7.0%: CPT | Performed by: STUDENT IN AN ORGANIZED HEALTH CARE EDUCATION/TRAINING PROGRAM

## 2024-01-23 PROCEDURE — 99213 OFFICE O/P EST LOW 20 MIN: CPT | Performed by: STUDENT IN AN ORGANIZED HEALTH CARE EDUCATION/TRAINING PROGRAM

## 2024-01-23 PROCEDURE — 1036F TOBACCO NON-USER: CPT | Performed by: STUDENT IN AN ORGANIZED HEALTH CARE EDUCATION/TRAINING PROGRAM

## 2024-01-23 PROCEDURE — 77073 BONE LENGTH STUDIES: CPT | Mod: RIGHT SIDE | Performed by: STUDENT IN AN ORGANIZED HEALTH CARE EDUCATION/TRAINING PROGRAM

## 2024-01-23 PROCEDURE — 97110 THERAPEUTIC EXERCISES: CPT | Mod: GP,CQ

## 2024-01-23 PROCEDURE — 97140 MANUAL THERAPY 1/> REGIONS: CPT | Mod: GP,CQ

## 2024-01-23 PROCEDURE — 73562 X-RAY EXAM OF KNEE 3: CPT | Mod: RIGHT SIDE | Performed by: STUDENT IN AN ORGANIZED HEALTH CARE EDUCATION/TRAINING PROGRAM

## 2024-01-23 RX ORDER — OXYCODONE HYDROCHLORIDE 5 MG/1
5-10 TABLET ORAL EVERY 6 HOURS PRN
Qty: 40 TABLET | Refills: 0 | Status: SHIPPED | OUTPATIENT
Start: 2024-01-23 | End: 2024-01-30

## 2024-01-23 ASSESSMENT — PAIN SCALES - GENERAL
PAINLEVEL_OUTOF10: 7
PAINLEVEL_OUTOF10: 6

## 2024-01-23 ASSESSMENT — PAIN DESCRIPTION - DESCRIPTORS
DESCRIPTORS: ACHING;SHARP
DESCRIPTORS: ACHING

## 2024-01-23 ASSESSMENT — PAIN - FUNCTIONAL ASSESSMENT
PAIN_FUNCTIONAL_ASSESSMENT: 0-10
PAIN_FUNCTIONAL_ASSESSMENT: 0-10

## 2024-01-23 NOTE — PROGRESS NOTES
Physical Therapy    Physical Therapy Treatment    Patient Name: Migue Adair  MRN: [id number]  Today's Date: 1/23/2024  Time Calculation  Start Time: 0400  Stop Time: 0445  Time Calculation (min): 45 min  Visit #9    Assessment:  PT Assessment  Evaluation/Treatment Tolerance: Patient tolerated treatment well  Performs exercises well with continued tolerable discomfort for most. Measured passive flexion today at at 108 degrees, previously was around 110 degrees. Introduced matrix machine passive weight flexion of the right knee today to further focus on increasing flexion. Manual flexion and extension at the end of the session. No other major complaints during this session.     Plan: Continue building strength, knee ROM, functional mobility endurance and gait corrections, as tolerable. Modalities for pain management, as needed.     Current Problem  1. Chronic pain of right knee        2. Right knee pain, unspecified chronicity  Follow Up In Physical Therapy        Subjective    Earlier today had around 8/10 pain, took pain medication before therapy, currently around a 6/10. Attempting to perform his home exercise program daily, as swelling and pain allows. Saw his ortho doctor prior to his visit. No other major updates/concerns at the moment.     Pain;  Pain Assessment: 0-10  Pain Score: 6  Pain Location: Knee  Pain Orientation: Right  Pain Descriptors: Aching    Objective;   Right knee extension lacking 8 degrees  Right knee flexion AROM 95  PROM 110 (antalgic end feel in flexion, springy end feel in extension)  Reciprocal ascending/descending requires assist of rail     Posture  WNL upper body. Right knee with tendency to rest in standing without terminal knee extension  Outcome Measures:  LEFS = 15/80    Treatments: x 35 min  SciFit Bike (seat 7) X 12 min  Knee flexion on step x 20 5-7 sec pause  Standing Hamstrings curls with flexion overpressure through foot on chair seat x 20  Step up with right x 20  Stair calf  stretch with quad set for knee extension x 1 min (quad set while holding stretch)  Matrix quad machine for flexion OP x 3 min total with breaks provided by opposite leg    Manual: x 10 min  Flexion/Extension with OP      OP EDUCATION: Educated patient on needed daily frequency for leg strengthening, ROM progression, and gait correction     Goals:  Active       PT Problem       PT Goal 1       Start:  10/31/23    Expected End:  12/30/23       Decrease pain to <4/10 50% of the time to demonstrate improved overall QoL.          PT Goal 2       Start:  10/31/23    Expected End:  12/30/23       Patient R knee extension ROM will improve by 5 degrees or more for functional ambulation.          PT Goal 3       Start:  10/31/23    Expected End:  12/30/23       Pt R knee flexion will improve from 89 to 120 or equal to L knee flexion for functional ambulation.          PT Goal 4       Start:  10/31/23    Expected End:  12/30/23       Pt will improve R hip flexion, abduction, adduction, and extension strength by 1 grade or more demonstrating improvements with daily activities.          PT Goal 5       Start:  10/31/23    Expected End:  12/30/23       Pt will be indep with HEP and instructed functional activities.             PT Problem       Patient Stated Goal 1       Start:  10/31/23    Expected End:  12/30/23       Pt will improve gait mechanics displaying proper knee extension, heel strike, and toe off with no assistive device.

## 2024-01-30 ENCOUNTER — DOCUMENTATION (OUTPATIENT)
Dept: PHYSICAL THERAPY | Facility: CLINIC | Age: 59
End: 2024-01-30
Payer: COMMERCIAL

## 2024-01-30 ENCOUNTER — APPOINTMENT (OUTPATIENT)
Dept: PHYSICAL THERAPY | Facility: CLINIC | Age: 59
End: 2024-01-30
Payer: COMMERCIAL

## 2024-01-30 NOTE — PROGRESS NOTES
Physical Therapy                 Therapy Communication Note    Patient Name: Migue Adair  MRN: [id number]  Today's Date: 1/30/2024     Discipline: Physical Therapy    Missed Visit Reason:  Patient cancelled his 1:45 pm PT follow up visit through my chart at 10;34 am. Reason not provided     Missed Time: Cancel    Comment:

## 2024-02-01 PROCEDURE — 80053 COMPREHEN METABOLIC PANEL: CPT

## 2024-02-01 PROCEDURE — 83036 HEMOGLOBIN GLYCOSYLATED A1C: CPT

## 2024-02-05 DIAGNOSIS — F17.200 TOBACCO DEPENDENCE: ICD-10-CM

## 2024-02-05 DIAGNOSIS — Z12.2 ENCOUNTER FOR SCREENING FOR LUNG CANCER: Primary | ICD-10-CM

## 2024-02-05 DIAGNOSIS — Z87.891 FORMER SMOKER: ICD-10-CM

## 2024-02-06 ENCOUNTER — TREATMENT (OUTPATIENT)
Dept: PHYSICAL THERAPY | Facility: CLINIC | Age: 59
End: 2024-02-06
Payer: COMMERCIAL

## 2024-02-06 DIAGNOSIS — G89.29 CHRONIC PAIN OF RIGHT KNEE: Primary | ICD-10-CM

## 2024-02-06 DIAGNOSIS — M25.561 CHRONIC PAIN OF RIGHT KNEE: Primary | ICD-10-CM

## 2024-02-06 DIAGNOSIS — M25.561 RIGHT KNEE PAIN, UNSPECIFIED CHRONICITY: ICD-10-CM

## 2024-02-06 PROCEDURE — 97140 MANUAL THERAPY 1/> REGIONS: CPT | Mod: GP,CQ

## 2024-02-06 PROCEDURE — 97110 THERAPEUTIC EXERCISES: CPT | Mod: GP,CQ

## 2024-02-06 ASSESSMENT — PAIN SCALES - GENERAL: PAINLEVEL_OUTOF10: 0 - NO PAIN

## 2024-02-06 ASSESSMENT — PAIN - FUNCTIONAL ASSESSMENT: PAIN_FUNCTIONAL_ASSESSMENT: 0-10

## 2024-02-06 NOTE — PROGRESS NOTES
Physical Therapy    Physical Therapy Treatment    Patient Name: Migue Adair  MRN: [id number]  Today's Date: 2/6/2024  Time Calculation  Start Time: 0145  Stop Time: 0230  Time Calculation (min): 45 min  Visit #10    Assessment:  PT Assessment  Evaluation/Treatment Tolerance: Patient tolerated treatment well  Responds well to his therapy session today. No pain reported during today's given exercises. Passive knee flexion measured today around 108 degrees, discomfort in end range, non hard end feel. Continues to have moderately intense right hamstring tightness.     Plan: Continue building strength, knee ROM, functional mobility endurance and gait corrections, as tolerable. Modalities for pain management, as needed.     Current Problem  1. Chronic pain of right knee        2. Right knee pain, unspecified chronicity  Follow Up In Physical Therapy        Subjective    Patient arrives today doing well. Walks in without his cane today. No pain reported at the start of his session. No further reported updates at this time.     Pain  Pain Assessment: 0-10  Pain Score: 0 - No pain    Objective;      Treatments: x 35 min  SciFit Bike (seat 7) X 12 min  Lunge on 2nd step x 20 5 sec holds  Hamstring stretching x 20  Standing Hamstrings curls with flexion overpressure through foot on chair seat x 20  Step up with right x 20 6 in step  Lateral step up x 20 6 in step  Stair calf stretch with quad set for knee extension x 1 min (quad set while holding stretch)  Matrix quad machine for flexion OP x 3 min total with breaks provided by opposite leg    Manual: x 10 min  Flexion/Extension with OP    OP EDUCATION: Educated patient on needed daily frequency for leg strengthening, ROM progression, and gait correction     Goals:  Active       PT Problem       PT Goal 1       Start:  10/31/23    Expected End:  12/30/23       Decrease pain to <4/10 50% of the time to demonstrate improved overall QoL.          PT Goal 2       Start:  10/31/23     Expected End:  12/30/23       Patient R knee extension ROM will improve by 5 degrees or more for functional ambulation.          PT Goal 3       Start:  10/31/23    Expected End:  12/30/23       Pt R knee flexion will improve from 89 to 120 or equal to L knee flexion for functional ambulation.          PT Goal 4       Start:  10/31/23    Expected End:  12/30/23       Pt will improve R hip flexion, abduction, adduction, and extension strength by 1 grade or more demonstrating improvements with daily activities.          PT Goal 5       Start:  10/31/23    Expected End:  12/30/23       Pt will be indep with HEP and instructed functional activities.             PT Problem       Patient Stated Goal 1       Start:  10/31/23    Expected End:  12/30/23       Pt will improve gait mechanics displaying proper knee extension, heel strike, and toe off with no assistive device.

## 2024-02-13 ENCOUNTER — TREATMENT (OUTPATIENT)
Dept: PHYSICAL THERAPY | Facility: CLINIC | Age: 59
End: 2024-02-13
Payer: COMMERCIAL

## 2024-02-13 DIAGNOSIS — M25.561 RIGHT KNEE PAIN, UNSPECIFIED CHRONICITY: ICD-10-CM

## 2024-02-13 PROCEDURE — 97110 THERAPEUTIC EXERCISES: CPT | Mod: GP

## 2024-02-13 NOTE — PROGRESS NOTES
Physical Therapy    Physical Therapy Treatment    Patient Name: Migue Adair  MRN: [id number]  Today's Date: 2/13/2024     Visit #11    Assessment:     Patient demonstrated understanding and knowledge of exercise regimen and appropriate exercise progression. Patient continues to feel stiffness after prolong sitting and continues to report knee swelling with prolong standing activities. HEP independence achieved with instructed regimen at this point. Patient will see orhto specialist in near future.    Plan: Continue building strength, knee ROM, functional mobility endurance and gait corrections, as tolerable. Modalities for pain management, as needed.  2-; No further skilled PT planned at this point. Chart will remain open until ortho follow up     Current Problem  1. Right knee pain, unspecified chronicity  Follow Up In Physical Therapy        Subjective   Both knees are stiff and achy today     Pain; 6/10 knee stiffness at beginning. Feels better after awhile. I still ice because knee swells if I stand on it too long        Objective;  LEFS = 49/80  Right knee ROM ; 8-105  PROM 5-110 (empty end feel)        Treatments: x 35 min  SciFit Bike (seat 7) X 12 min  Lunge on 2nd step x 20 5 sec holds  Hamstring stretching x 20  Standing Hamstrings curls with flexion overpressure through foot on chair seat x 20  Step up with right x 20 6 in step  Lateral step up x 20 6 in step  Stair calf stretch with quad set for knee extension x 1 min (quad set while holding stretch)  Matrix quad machine for flexion OP x 3 min total with breaks provided by opposite leg    Manual: x 10 min  Flexion/Extension with OP    OP EDUCATION: Educated patient on needed daily frequency for leg strengthening, ROM progression, and gait correction     Goals:  Active       PT Problem       PT Goal 1       Start:  10/31/23    Expected End:  12/30/23       Decrease pain to <4/10 50% of the time to demonstrate improved overall QoL.          PT Goal 2        Start:  10/31/23    Expected End:  12/30/23       Patient R knee extension ROM will improve by 5 degrees or more for functional ambulation.          PT Goal 3       Start:  10/31/23    Expected End:  12/30/23       Pt R knee flexion will improve from 89 to 120 or equal to L knee flexion for functional ambulation.          PT Goal 4       Start:  10/31/23    Expected End:  12/30/23       Pt will improve R hip flexion, abduction, adduction, and extension strength by 1 grade or more demonstrating improvements with daily activities.          PT Goal 5       Start:  10/31/23    Expected End:  12/30/23       Pt will be indep with HEP and instructed functional activities.             PT Problem       Patient Stated Goal 1       Start:  10/31/23    Expected End:  12/30/23       Pt will improve gait mechanics displaying proper knee extension, heel strike, and toe off with no assistive device.

## 2024-02-17 NOTE — PROGRESS NOTES
Diagnosis: End stage osteoarthritis Right Knee  Procedure Performed: Right Total Knee Arthroplasty   Date of Surgery: October 6, 2023    Subjective:  Migue Adair is here for regularly scheduled follow-up after the above procedure.  The patient continues to make slow and steady progress.  The patient has anterior knee pain with prolonged standing.  He does feel that there is intermittent swelling within the knee.  He continues to use ice.  He feels that the pain is slowly improving.  He continues to use opioid medication.  He is amatory with a cane.    There has been no interval change in this patient's past medical, surgical, medications, allergies, family history or social history since the most recent visit to a provider within our department.  14 point review of systems was performed, reviewed, and negative except for pertinent positives documented in the history of present illness.    Physical Examination:   General: Patient is alert and oriented x 3 and appears comfortable.  Gait: Patient ambulates with slight antalgic gait.  Wound: Incision is well healed over the right knee. There is mild warmth and effusion about the knee, both consistent with the normal post-operative healing. There is no erythema, incisional drainage, fluctuance, or suture abscess.   Right Knee: ROM 0-100  Stable to varus and valgus stress at 0 and 30 degrees.   Patella tracks midline  Expected post operative swelling and tenderness   Calf: No swelling or tenderness  Able to dorsi-flex and plantar-flex the ankle with appropriate strength. Able to wiggle all toes.   The foot is warm and well perfused with palpable pulses.   Sensation is intact to light touch.     Radiographs: AP, Merchant and Lateral Right Knee: Right total knee arthroplasty in place. There is no evidence of fracture or dislocation. The joint is located. Compared to immediate post-operative x-rays, no change in appearance. The patella is tracking centrally.     Assessment  and Plan: Migue Adair is progressing well approximately 3.5 months s/p right total knee arthroplasty.  I believe a good deal of patient's discomfort is due to his stiffness.  At this juncture, patient would like to continue with therapy.  I think this is reasonable and medically necessary.  The patient at this juncture is too far out to be considered a candidate for postmanipulation under anesthesia.    If he continues to be symptomatic and is dissatisfied with his outcome, we can consider an arthroscopic lysis of adhesions with manipulation under anesthesia.    Patient is still requiring oxycodone.  Due to the nature of the surgery and the necessary post-operative rehabilitation, the patient will require more than 30 morphine MEQ per day for 6 days. The patient occasionally patient rates his pain a 9 or 10 on the pain scale.  I have personally reviewed the OARRS report for this patient. This report is scanned into the electronic medical record. I have considered the risks of abuse, dependence, addiction and diversion.    I will see patient back in 2 months for repeat medical assessment.      *This note was created using voice recognition software and was not corrected for typographical or grammatical errors.*

## 2024-02-20 ENCOUNTER — HOSPITAL ENCOUNTER (OUTPATIENT)
Dept: RADIOLOGY | Facility: CLINIC | Age: 59
End: 2024-02-20
Payer: COMMERCIAL

## 2024-02-20 DIAGNOSIS — E11.9 TYPE 2 DIABETES MELLITUS WITHOUT COMPLICATION, WITHOUT LONG-TERM CURRENT USE OF INSULIN (MULTI): Primary | ICD-10-CM

## 2024-02-20 RX ORDER — DULAGLUTIDE 3 MG/.5ML
3 INJECTION, SOLUTION SUBCUTANEOUS
Qty: 6 ML | Refills: 1 | Status: SHIPPED | OUTPATIENT
Start: 2024-02-20 | End: 2024-05-07 | Stop reason: SDUPTHER

## 2024-02-27 ENCOUNTER — HOSPITAL ENCOUNTER (OUTPATIENT)
Dept: RADIOLOGY | Facility: CLINIC | Age: 59
Discharge: HOME | End: 2024-02-27
Payer: COMMERCIAL

## 2024-02-27 DIAGNOSIS — Z87.891 FORMER SMOKER: ICD-10-CM

## 2024-02-27 DIAGNOSIS — F17.200 TOBACCO DEPENDENCE: ICD-10-CM

## 2024-02-27 DIAGNOSIS — Z12.2 ENCOUNTER FOR SCREENING FOR LUNG CANCER: ICD-10-CM

## 2024-02-27 PROCEDURE — 71271 CT THORAX LUNG CANCER SCR C-: CPT

## 2024-03-06 DIAGNOSIS — M25.561 CHRONIC PAIN OF BOTH KNEES: ICD-10-CM

## 2024-03-06 DIAGNOSIS — G89.29 CHRONIC PAIN OF BOTH KNEES: ICD-10-CM

## 2024-03-06 DIAGNOSIS — M25.562 CHRONIC PAIN OF BOTH KNEES: ICD-10-CM

## 2024-03-06 DIAGNOSIS — G62.9 NEUROPATHY: ICD-10-CM

## 2024-03-07 RX ORDER — ACETAMINOPHEN 500 MG
TABLET ORAL
Qty: 120 TABLET | Refills: 1 | Status: SHIPPED | OUTPATIENT
Start: 2024-03-07

## 2024-03-07 RX ORDER — IBUPROFEN 600 MG/1
600 TABLET ORAL 3 TIMES DAILY PRN
Qty: 240 TABLET | Refills: 1 | Status: SHIPPED | OUTPATIENT
Start: 2024-03-07

## 2024-03-07 RX ORDER — GABAPENTIN 400 MG/1
400 CAPSULE ORAL 3 TIMES DAILY
Qty: 270 CAPSULE | Refills: 1 | Status: SHIPPED | OUTPATIENT
Start: 2024-03-07

## 2024-03-13 ENCOUNTER — DOCUMENTATION (OUTPATIENT)
Dept: PHYSICAL THERAPY | Facility: CLINIC | Age: 59
End: 2024-03-13
Payer: COMMERCIAL

## 2024-03-13 NOTE — PROGRESS NOTES
Physical Therapy    Discharge Summary    Name: Migue Adair  MRN: [id number]  : 1965  Date: 24    Discharge Summary: PT    Discharge Information: Date of discharge 3-, Date of last visit 2024, Date of evaluation 10-, Number of attended visits 10, Referred by Daniel Michelle, and Referred for s/p TKA    Therapy Summary: Patient treated with manual joint mobilization, instructions on therex for stretching, ROM, strength building, and functional mobility training.     Discharge Status: Patient felt that he achieved his goals and knows importance of exercise regimen maintenance      Rehab Discharge Reason: Achieved all and/or the most significant goals(s)

## 2024-03-26 ENCOUNTER — OFFICE VISIT (OUTPATIENT)
Dept: ORTHOPEDIC SURGERY | Facility: CLINIC | Age: 59
End: 2024-03-26
Payer: COMMERCIAL

## 2024-03-26 DIAGNOSIS — Z96.651 AFTERCARE FOLLOWING RIGHT KNEE JOINT REPLACEMENT SURGERY: Primary | ICD-10-CM

## 2024-03-26 DIAGNOSIS — Z47.1 AFTERCARE FOLLOWING RIGHT KNEE JOINT REPLACEMENT SURGERY: Primary | ICD-10-CM

## 2024-03-26 PROCEDURE — 3044F HG A1C LEVEL LT 7.0%: CPT | Performed by: STUDENT IN AN ORGANIZED HEALTH CARE EDUCATION/TRAINING PROGRAM

## 2024-03-26 PROCEDURE — 99214 OFFICE O/P EST MOD 30 MIN: CPT | Performed by: STUDENT IN AN ORGANIZED HEALTH CARE EDUCATION/TRAINING PROGRAM

## 2024-04-15 NOTE — PROGRESS NOTES
Diagnosis: End stage osteoarthritis Right Knee  Procedure Performed: Right Total Knee Arthroplasty   Date of Surgery: October 6, 2023    Subjective:  Migue Adair is here for regularly scheduled follow-up after the above procedure.  Postoperative course has been complicated by prolonged pain.  Currently, he is using mostly Tylenol to control his pain.  He does continue complain of swelling with activity.  He has rare instability events.  He denies any drainage from surgical incision.  No falls or trauma.    There has been no interval change in this patient's past medical, surgical, medications, allergies, family history or social history since the most recent visit to a provider within our department.  14 point review of systems was performed, reviewed, and negative except for pertinent positives documented in the history of present illness.    Physical Examination:   General: Patient is alert and oriented x 3 and appears comfortable.  Gait: Patient ambulates with slight antalgic gait.  Wound: Incision is well healed over the right knee. There is mild warmth and effusion about the knee, both consistent with the normal post-operative healing. There is no erythema, incisional drainage, fluctuance, or suture abscess.   Right Knee: ROM 0-110  Stable to varus and valgus stress at 0 and 30 degrees.   Patella tracks midline  Expected post operative swelling and tenderness   Calf: No swelling or tenderness  Able to dorsi-flex and plantar-flex the ankle with appropriate strength. Able to wiggle all toes.   The foot is warm and well perfused with palpable pulses.   Sensation is intact to light touch.     Radiographs: None today    Assessment and Plan: Migue Adair is progressing approximately 5 months s/p right total knee arthroplasty.  We discussed long-term prognosis.  I recommended the patient continue to work on his home exercise regimen.  He should continue to utilize over-the-counter medications as needed for pain  control.    Patient was reminded that he would need to take antibiotics prior to dental visits.    Patient will see me in October.  At that juncture, we will obtain repeat knee x-rays as well as a limb length.  Patient was encouraged to call the office if he has any questions or concerns before then.    *This note was created using voice recognition software and was not corrected for typographical or grammatical errors.*

## 2024-04-18 ENCOUNTER — OFFICE VISIT (OUTPATIENT)
Dept: ENDOCRINOLOGY | Facility: CLINIC | Age: 59
End: 2024-04-18
Payer: COMMERCIAL

## 2024-04-18 VITALS
BODY MASS INDEX: 33.75 KG/M2 | RESPIRATION RATE: 16 BRPM | SYSTOLIC BLOOD PRESSURE: 124 MMHG | DIASTOLIC BLOOD PRESSURE: 70 MMHG | WEIGHT: 263 LBS | HEART RATE: 81 BPM | HEIGHT: 74 IN

## 2024-04-18 DIAGNOSIS — I10 HYPERTENSION, UNSPECIFIED TYPE: ICD-10-CM

## 2024-04-18 DIAGNOSIS — E11.9 TYPE 2 DIABETES MELLITUS WITHOUT COMPLICATION, WITHOUT LONG-TERM CURRENT USE OF INSULIN (MULTI): Primary | ICD-10-CM

## 2024-04-18 DIAGNOSIS — E78.5 HYPERLIPIDEMIA, UNSPECIFIED HYPERLIPIDEMIA TYPE: ICD-10-CM

## 2024-04-18 PROCEDURE — 3074F SYST BP LT 130 MM HG: CPT | Performed by: INTERNAL MEDICINE

## 2024-04-18 PROCEDURE — 1036F TOBACCO NON-USER: CPT | Performed by: INTERNAL MEDICINE

## 2024-04-18 PROCEDURE — 99214 OFFICE O/P EST MOD 30 MIN: CPT | Performed by: INTERNAL MEDICINE

## 2024-04-18 PROCEDURE — 3044F HG A1C LEVEL LT 7.0%: CPT | Performed by: INTERNAL MEDICINE

## 2024-04-18 PROCEDURE — 3078F DIAST BP <80 MM HG: CPT | Performed by: INTERNAL MEDICINE

## 2024-04-18 ASSESSMENT — ENCOUNTER SYMPTOMS
VOMITING: 0
NAUSEA: 0
DIARRHEA: 0
FEVER: 0
SHORTNESS OF BREATH: 0
CHILLS: 0
DIZZINESS: 0
LIGHT-HEADEDNESS: 0

## 2024-04-18 NOTE — PROGRESS NOTES
Endocrinology: Follow up visit  Subjective   Patient ID: Migue Adair is a 59 y.o. male who presents for Diabetes (Type 2 ).    PCP: Tor Nettles, DO    HPI  Since last visit doing ok with knee rehab, needs the other one done but hoping to put it off. Sugars excellent and recent A1c 6.3. .  Doing great with trulicity.   Forgot to schedule eye exam  Sglt2: refused due to hx groin infections    Review of Systems   Constitutional:  Negative for chills and fever.   Respiratory:  Negative for shortness of breath.    Gastrointestinal:  Negative for diarrhea, nausea and vomiting.   Endocrine: Negative for cold intolerance and heat intolerance.   Neurological:  Negative for dizziness and light-headedness.       Patient Active Problem List   Diagnosis    Arm mass    Benign essential hypertension    Bilateral sensorineural hearing loss    Chronic headaches    GERD (gastroesophageal reflux disease)    Hearing loss    Hyperlipidemia    Hypermetropia of both eyes    Lesion of vocal cord    Lipoma of right upper extremity    Myopia of both eyes    Neuropathy    Obstructive sleep apnea, adult    Optic nerve cupping of both eyes    Organic impotence    Primary localized osteoarthritis of both knees    Pulmonary nodules    Skin lump of arm    HTN (hypertension)    Diabetes mellitus, type 2 (Multi)    Obesity    TORSTEN (obstructive sleep apnea)    Peripheral neuropathy    Right knee pain        Home Meds:  Current Outpatient Medications   Medication Instructions    acetaminophen (Tylenol) 500 mg tablet TAKE 1 TABLET EVERY 4 TO 6 HOURS AS NEEDED    atorvastatin (LIPITOR) 10 mg, oral, Daily    chlorhexidine (Peridex) 0.12 % solution     dilTIAZem CD (Cardizem CD) 240 mg 24 hr capsule TAKE 1 CAPSULE DAILY (NEED APPOINTMENT)    FreeStyle Tesfaye sensor system (FreeStyle Tesfaye 14 Day Sensor) kit Use as directed    gabapentin (NEURONTIN) 400 mg, oral, 3 times daily    glipiZIDE (GLUCOTROL) 5 mg, oral, Daily     mg, oral, 3 times daily  "PRN, Take with food    metFORMIN (GLUCOPHAGE) 1,000 mg, oral, 2 times daily    omeprazole (PriLOSEC) 20 mg DR capsule 1 capsule, oral, Daily    Trulicity 3 mg, subcutaneous, Once Weekly        No Known Allergies     Objective   Vitals:    04/18/24 1252   BP: 124/70   Pulse: 81   Resp: 16      Vitals:    04/18/24 1252   Weight: 119 kg (263 lb)      Body mass index is 33.77 kg/m².   Physical Exam  Constitutional:       Appearance: Normal appearance. He is overweight.   HENT:      Head: Normocephalic and atraumatic.   Neck:      Thyroid: No thyroid mass, thyromegaly or thyroid tenderness.   Cardiovascular:      Rate and Rhythm: Normal rate and regular rhythm.      Heart sounds: No murmur heard.     No gallop.   Pulmonary:      Effort: Pulmonary effort is normal.      Breath sounds: Normal breath sounds.   Abdominal:      Palpations: Abdomen is soft.      Comments: benign   Neurological:      General: No focal deficit present.      Mental Status: He is alert and oriented to person, place, and time.      Deep Tendon Reflexes: Reflexes are normal and symmetric.   Psychiatric:         Behavior: Behavior is cooperative.         Labs:  Lab Results   Component Value Date    HGBA1C 6.3 (H) 02/01/2024    TSH 2.25 07/20/2023    FREET4 0.81 07/20/2023      No results found for: \"PR1\", \"THYROIDPAB\", \"TSI\"     Assessment/Plan   Problem List Items Addressed This Visit       Hyperlipidemia    HTN (hypertension)    Diabetes mellitus, type 2 (Multi) - Primary    Relevant Orders    Comprehensive Metabolic Panel    CBC    Lipid Panel    Hemoglobin A1C    Albumin , Urine Random     Dm2:  Fasting labs prior to next visit  No changes to meds  Reminded to schedule eye exam  Htn:  Bp excellent  Lipids:  Recheck next time    Electronically signed by:  Annamaria Alonso MD 04/18/24 1:25 PM              "

## 2024-05-07 DIAGNOSIS — E11.9 TYPE 2 DIABETES MELLITUS WITHOUT COMPLICATION, WITHOUT LONG-TERM CURRENT USE OF INSULIN (MULTI): ICD-10-CM

## 2024-05-07 RX ORDER — DULAGLUTIDE 3 MG/.5ML
3 INJECTION, SOLUTION SUBCUTANEOUS
Qty: 6 ML | Refills: 1 | Status: SHIPPED | OUTPATIENT
Start: 2024-05-12 | End: 2024-05-07 | Stop reason: SDUPTHER

## 2024-05-07 RX ORDER — DULAGLUTIDE 3 MG/.5ML
3 INJECTION, SOLUTION SUBCUTANEOUS
Qty: 6 ML | Refills: 1 | Status: SHIPPED | OUTPATIENT
Start: 2024-05-12 | End: 2025-05-12

## 2024-05-10 DIAGNOSIS — E11.9 TYPE 2 DIABETES MELLITUS WITHOUT COMPLICATION, WITHOUT LONG-TERM CURRENT USE OF INSULIN (MULTI): Primary | ICD-10-CM

## 2024-06-18 DIAGNOSIS — I10 BENIGN ESSENTIAL HYPERTENSION: ICD-10-CM

## 2024-06-18 RX ORDER — DILTIAZEM HYDROCHLORIDE 240 MG/1
CAPSULE, COATED, EXTENDED RELEASE ORAL
Qty: 90 CAPSULE | Refills: 0 | Status: SHIPPED | OUTPATIENT
Start: 2024-06-18

## 2024-07-09 DIAGNOSIS — E11.9 TYPE 2 DIABETES MELLITUS WITHOUT COMPLICATION, WITH LONG-TERM CURRENT USE OF INSULIN (MULTI): ICD-10-CM

## 2024-07-09 DIAGNOSIS — Z79.4 TYPE 2 DIABETES MELLITUS WITHOUT COMPLICATION, WITH LONG-TERM CURRENT USE OF INSULIN (MULTI): ICD-10-CM

## 2024-07-09 DIAGNOSIS — E78.5 HYPERLIPIDEMIA, UNSPECIFIED HYPERLIPIDEMIA TYPE: ICD-10-CM

## 2024-07-09 DIAGNOSIS — I10 BENIGN ESSENTIAL HYPERTENSION: ICD-10-CM

## 2024-07-10 ENCOUNTER — APPOINTMENT (OUTPATIENT)
Dept: PRIMARY CARE | Facility: CLINIC | Age: 59
End: 2024-07-10
Payer: COMMERCIAL

## 2024-07-10 VITALS — WEIGHT: 266 LBS | SYSTOLIC BLOOD PRESSURE: 126 MMHG | BODY MASS INDEX: 34.15 KG/M2 | DIASTOLIC BLOOD PRESSURE: 70 MMHG

## 2024-07-10 DIAGNOSIS — G62.9 NEUROPATHY: ICD-10-CM

## 2024-07-10 DIAGNOSIS — E78.5 HYPERLIPIDEMIA, UNSPECIFIED HYPERLIPIDEMIA TYPE: ICD-10-CM

## 2024-07-10 DIAGNOSIS — I10 BENIGN ESSENTIAL HYPERTENSION: ICD-10-CM

## 2024-07-10 DIAGNOSIS — M17.0 PRIMARY LOCALIZED OSTEOARTHRITIS OF BOTH KNEES: ICD-10-CM

## 2024-07-10 DIAGNOSIS — Z00.00 HEALTHCARE MAINTENANCE: Primary | ICD-10-CM

## 2024-07-10 DIAGNOSIS — Z79.4 TYPE 2 DIABETES MELLITUS WITHOUT COMPLICATION, WITH LONG-TERM CURRENT USE OF INSULIN (MULTI): ICD-10-CM

## 2024-07-10 DIAGNOSIS — E11.9 TYPE 2 DIABETES MELLITUS WITHOUT COMPLICATION, WITH LONG-TERM CURRENT USE OF INSULIN (MULTI): ICD-10-CM

## 2024-07-10 PROCEDURE — 3078F DIAST BP <80 MM HG: CPT | Performed by: INTERNAL MEDICINE

## 2024-07-10 PROCEDURE — 3074F SYST BP LT 130 MM HG: CPT | Performed by: INTERNAL MEDICINE

## 2024-07-10 PROCEDURE — 3044F HG A1C LEVEL LT 7.0%: CPT | Performed by: INTERNAL MEDICINE

## 2024-07-10 PROCEDURE — 1036F TOBACCO NON-USER: CPT | Performed by: INTERNAL MEDICINE

## 2024-07-10 PROCEDURE — 99214 OFFICE O/P EST MOD 30 MIN: CPT | Performed by: INTERNAL MEDICINE

## 2024-07-10 RX ORDER — ATORVASTATIN CALCIUM 10 MG/1
10 TABLET, FILM COATED ORAL DAILY
Qty: 90 TABLET | Refills: 1 | OUTPATIENT
Start: 2024-07-10

## 2024-07-10 RX ORDER — ATORVASTATIN CALCIUM 10 MG/1
10 TABLET, FILM COATED ORAL DAILY
Qty: 90 TABLET | Refills: 1 | Status: SHIPPED | OUTPATIENT
Start: 2024-07-10

## 2024-07-10 RX ORDER — GLIPIZIDE 5 MG/1
5 TABLET ORAL DAILY
Qty: 90 TABLET | Refills: 3 | Status: SHIPPED | OUTPATIENT
Start: 2024-07-10

## 2024-07-10 RX ORDER — METFORMIN HYDROCHLORIDE 1000 MG/1
1000 TABLET ORAL 2 TIMES DAILY
Qty: 180 TABLET | Refills: 1 | Status: SHIPPED | OUTPATIENT
Start: 2024-07-10

## 2024-07-10 ASSESSMENT — PATIENT HEALTH QUESTIONNAIRE - PHQ9
SUM OF ALL RESPONSES TO PHQ9 QUESTIONS 1 AND 2: 0
2. FEELING DOWN, DEPRESSED OR HOPELESS: NOT AT ALL
1. LITTLE INTEREST OR PLEASURE IN DOING THINGS: NOT AT ALL

## 2024-07-10 NOTE — PROGRESS NOTES
Subjective   Patient ID: Migue Adair is a 59 y.o. male who presents for Follow-up and Med Refill.  HPI              old male with a PMHx of DM2, GERD, diabetic Neuropathy , status post right arm lipoma removal excision , pulmonary nodules 3 mm and severe right left knee osteoarthritis status post right total knee arthroplasty October 2023    Presents for follow-up he does have severe chronic knee pain for years right greater than left consistent grade 7 out of 10 he did have a right knee replacement recently still has a lot of pain there and swelling he does note some improvement with physical therapy and is doing but the issue still persists  He also has decreased range of motion of the right knee can only bend at the far in flexion  He states he saw a disability doctor in [place] who said his right leg was 3 times the size of the left and was told to use the cane on the other side to help strengthen the other leg  Left knee arthralgia persists worse when he stands on it he cannot walk or work or stand on it due to pain he is not wearing a left leg brace hinged  He has chronic neuropathy pins-and-needles that are worse with standing of the feet that are severe  Denies paralysis trauma          Health Maintenance:      Colonoscopy: 2022 Cologuard due 2025      Mammogram:      Pelvic/Pap:      Low dose chest CT:      Aorta duplex:      Optho:      Podiatry:        Vaccines:      Prevnar 20:      Prevnar 13:      Pneumovax 23:      Tdap:      Shingrix:      COVID:      Influenza:        ROS:      General:  denies fever/chills/weight loss      Head: denies HA/trauma/masses/dizziness      Eyes: denies vision change/loss of vision/blurry vision/diplopia/eye pain      Ears: denies hearing loss/tinnitus/otalgia/otorrhea      Nose: denies nasal drainage/anosmia      Throat: denies dysphagia/odynophagia      Lymphatics: denies lymph node swelling      Cardiac: denies CP/palpitations/orthopnea/PND      Pulmonary: denies  dyspnea/cough/wheezing      GI: denies abd pain/n/v/diarrhea/melena/hematochezia/hematemesis      : denies dysuria/hematuria/change frequency      Genital: denies genital discharge/lesions      Skin: denies rashes/lesions/masses      MSK: Sometimes notices the left knee ronak less he wears the brace chronic right knee pain and left knee pain left seems worse since he is compensating more with it decreased range of motion of right knee since surgery getting somewhat better with physical therapy denies weakness/swelling/edema/gait imbalance/pain      Neuro: denies paresthesias/seizures/dysarthria      Psych: denies depression/anxiety/suicidal or homicidal ideations            Objective   /70   Wt 121 kg (266 lb)   BMI 34.15 kg/m²      Physical Exam:     General: AO3, NAD     Head: atraumatic/NC     Eyes: EOMI/PERRLA. Negative APD     Ears: TM pearly gray, EAC clear. No lesions or erythema     Nose: symmetric nares, no discharge     Throat: trachea midline, uvula midline pink mucosa. No thyromegaly     Lymphatics: no cervical/supraclavicular/ant or posterior cervical adenopathy/axillary/inguinal adenopathy     Breast: not examined     Chest: no deformity or tenderness to palpation     Pulm: CTA b/l, no wheeze/rhonchi/rales. nonlabored     Cardiac: RRR +s1s2, no m/r/g.      GI: soft, NT/ND. Normoactive Bsx4. No rebound/guarding.     Rectal: no examined     MSK: Antalgic gait with cane right anterior patella scar clean dry intact  decreased range of motion by about 15 degrees flexion extension of knee left knee crepitance leg 5/5 strength UE LE. No edema/clubbing/cyanosis     Skin: no rashes/lesions     Vascular: 2+ palp DP PT radials b/l. Negative carotid bruit     Neuro: Decree sensation to pinprick bilateral feet up to the ankle CNII-XII intact. No focal deficits. Reflexes 2/4 brachioradialis bicep tricep patellar achilles. Finger to nose intact.     Psych: appropriate mood/affect                    No  "results found for: \"BMPR1A\", \"CBCDIF\"      Assessment/Plan   Diagnoses and all orders for this visit:  Healthcare maintenance  -     CBC and Auto Differential; Future  -     Comprehensive Metabolic Panel; Future  -     Hemoglobin A1C; Future  -     Thyroxine, Free; Future  -     Thyroid Stimulating Hormone; Future  -     Lipid Panel; Future  -     Prostate Specific Antigen, Screen; Future  Benign essential hypertension  -     atorvastatin (Lipitor) 10 mg tablet; Take 1 tablet (10 mg) by mouth once daily.  Hyperlipidemia, unspecified hyperlipidemia type  -     atorvastatin (Lipitor) 10 mg tablet; Take 1 tablet (10 mg) by mouth once daily.  Type 2 diabetes mellitus without complication, with long-term current use of insulin (Multi)  -     glipiZIDE (Glucotrol) 5 mg tablet; Take 1 tablet (5 mg) by mouth once daily.  Neuropathy  Primary localized osteoarthritis of both knees    As you stated your disability forms for the [employer] group home  you will be sending me forms as needed which I am happy to do for medical reasons given your symptoms are unable to work at this time based on your knee arthritis decreased mobility and severe neuropathy of the feet    Call and follow-up with orthopedics recommendations noted Home exercises therapy repeat x-rays in October    Endocrinology recommendations noted labs recheck lipid panel        Thank you for making appointment today Migue    Please follow-up 6 months     Tor Nettles DO, FACOI  Tor Nettles DO  "

## 2024-07-11 ENCOUNTER — LAB (OUTPATIENT)
Dept: LAB | Facility: LAB | Age: 59
End: 2024-07-11
Payer: COMMERCIAL

## 2024-07-11 DIAGNOSIS — E11.9 TYPE 2 DIABETES MELLITUS WITHOUT COMPLICATION, WITHOUT LONG-TERM CURRENT USE OF INSULIN (MULTI): ICD-10-CM

## 2024-07-11 DIAGNOSIS — Z00.00 HEALTHCARE MAINTENANCE: ICD-10-CM

## 2024-07-11 LAB
ALBUMIN SERPL BCP-MCNC: 4 G/DL (ref 3.4–5)
ALP SERPL-CCNC: 62 U/L (ref 33–136)
ALT SERPL W P-5'-P-CCNC: 19 U/L (ref 10–52)
ANION GAP SERPL CALC-SCNC: 12 MMOL/L (ref 10–20)
AST SERPL W P-5'-P-CCNC: 17 U/L (ref 9–39)
BASOPHILS # BLD AUTO: 0.05 X10*3/UL (ref 0–0.1)
BASOPHILS NFR BLD AUTO: 0.9 %
BILIRUB SERPL-MCNC: 0.5 MG/DL (ref 0–1.2)
BUN SERPL-MCNC: 12 MG/DL (ref 6–23)
CALCIUM SERPL-MCNC: 8.7 MG/DL (ref 8.6–10.3)
CHLORIDE SERPL-SCNC: 105 MMOL/L (ref 98–107)
CHOLEST SERPL-MCNC: 145 MG/DL (ref 0–199)
CHOLESTEROL/HDL RATIO: 2.7
CO2 SERPL-SCNC: 27 MMOL/L (ref 21–32)
CREAT SERPL-MCNC: 1 MG/DL (ref 0.5–1.3)
CREAT UR-MCNC: 102.9 MG/DL (ref 20–370)
EGFRCR SERPLBLD CKD-EPI 2021: 86 ML/MIN/1.73M*2
EOSINOPHIL # BLD AUTO: 0.14 X10*3/UL (ref 0–0.7)
EOSINOPHIL NFR BLD AUTO: 2.4 %
ERYTHROCYTE [DISTWIDTH] IN BLOOD BY AUTOMATED COUNT: 13.6 % (ref 11.5–14.5)
EST. AVERAGE GLUCOSE BLD GHB EST-MCNC: 148 MG/DL
GLUCOSE SERPL-MCNC: 87 MG/DL (ref 74–99)
HBA1C MFR BLD: 6.8 %
HCT VFR BLD AUTO: 39.8 % (ref 41–52)
HDLC SERPL-MCNC: 52.8 MG/DL
HGB BLD-MCNC: 13 G/DL (ref 13.5–17.5)
IMM GRANULOCYTES # BLD AUTO: 0.01 X10*3/UL (ref 0–0.7)
IMM GRANULOCYTES NFR BLD AUTO: 0.2 % (ref 0–0.9)
LDLC SERPL CALC-MCNC: 82 MG/DL
LYMPHOCYTES # BLD AUTO: 1.41 X10*3/UL (ref 1.2–4.8)
LYMPHOCYTES NFR BLD AUTO: 24.4 %
MCH RBC QN AUTO: 29.5 PG (ref 26–34)
MCHC RBC AUTO-ENTMCNC: 32.7 G/DL (ref 32–36)
MCV RBC AUTO: 91 FL (ref 80–100)
MICROALBUMIN UR-MCNC: 7.7 MG/L
MICROALBUMIN/CREAT UR: 7.5 UG/MG CREAT
MONOCYTES # BLD AUTO: 0.59 X10*3/UL (ref 0.1–1)
MONOCYTES NFR BLD AUTO: 10.2 %
NEUTROPHILS # BLD AUTO: 3.57 X10*3/UL (ref 1.2–7.7)
NEUTROPHILS NFR BLD AUTO: 61.9 %
NON HDL CHOLESTEROL: 92 MG/DL (ref 0–149)
NRBC BLD-RTO: 0 /100 WBCS (ref 0–0)
PLATELET # BLD AUTO: 244 X10*3/UL (ref 150–450)
POTASSIUM SERPL-SCNC: 4.4 MMOL/L (ref 3.5–5.3)
PROT SERPL-MCNC: 6.4 G/DL (ref 6.4–8.2)
PSA SERPL-MCNC: 1.35 NG/ML
RBC # BLD AUTO: 4.4 X10*6/UL (ref 4.5–5.9)
SODIUM SERPL-SCNC: 140 MMOL/L (ref 136–145)
T4 FREE SERPL-MCNC: 0.84 NG/DL (ref 0.61–1.12)
TRIGL SERPL-MCNC: 53 MG/DL (ref 0–149)
TSH SERPL-ACNC: 1.77 MIU/L (ref 0.44–3.98)
VLDL: 11 MG/DL (ref 0–40)
WBC # BLD AUTO: 5.8 X10*3/UL (ref 4.4–11.3)

## 2024-07-11 PROCEDURE — 84443 ASSAY THYROID STIM HORMONE: CPT

## 2024-07-11 PROCEDURE — 82570 ASSAY OF URINE CREATININE: CPT

## 2024-07-11 PROCEDURE — 84153 ASSAY OF PSA TOTAL: CPT

## 2024-07-11 PROCEDURE — 80053 COMPREHEN METABOLIC PANEL: CPT

## 2024-07-11 PROCEDURE — 84439 ASSAY OF FREE THYROXINE: CPT

## 2024-07-11 PROCEDURE — 82043 UR ALBUMIN QUANTITATIVE: CPT

## 2024-07-11 PROCEDURE — 83036 HEMOGLOBIN GLYCOSYLATED A1C: CPT

## 2024-07-11 PROCEDURE — 80061 LIPID PANEL: CPT

## 2024-07-11 PROCEDURE — 36415 COLL VENOUS BLD VENIPUNCTURE: CPT

## 2024-07-11 PROCEDURE — 85025 COMPLETE CBC W/AUTO DIFF WBC: CPT

## 2024-07-15 DIAGNOSIS — E11.9 TYPE 2 DIABETES MELLITUS WITHOUT COMPLICATION, WITHOUT LONG-TERM CURRENT USE OF INSULIN (MULTI): ICD-10-CM

## 2024-07-15 RX ORDER — DULAGLUTIDE 3 MG/.5ML
3 INJECTION, SOLUTION SUBCUTANEOUS
Qty: 6 ML | Refills: 3 | Status: SHIPPED | OUTPATIENT
Start: 2024-07-21 | End: 2025-07-21

## 2024-07-25 DIAGNOSIS — D64.9 ANEMIA, UNSPECIFIED TYPE: Primary | ICD-10-CM

## 2024-08-16 DIAGNOSIS — G89.29 CHRONIC PAIN OF BOTH KNEES: ICD-10-CM

## 2024-08-16 DIAGNOSIS — M25.562 CHRONIC PAIN OF BOTH KNEES: ICD-10-CM

## 2024-08-16 DIAGNOSIS — M25.561 CHRONIC PAIN OF BOTH KNEES: ICD-10-CM

## 2024-08-16 RX ORDER — IBUPROFEN 600 MG/1
600 TABLET ORAL 3 TIMES DAILY PRN
Qty: 270 TABLET | Refills: 1 | Status: SHIPPED | OUTPATIENT
Start: 2024-08-16

## 2024-09-16 DIAGNOSIS — I10 BENIGN ESSENTIAL HYPERTENSION: ICD-10-CM

## 2024-09-17 RX ORDER — DILTIAZEM HYDROCHLORIDE 240 MG/1
CAPSULE, COATED, EXTENDED RELEASE ORAL
Qty: 90 CAPSULE | Refills: 0 | Status: SHIPPED | OUTPATIENT
Start: 2024-09-17

## 2024-10-08 ENCOUNTER — HOSPITAL ENCOUNTER (OUTPATIENT)
Dept: RADIOLOGY | Facility: CLINIC | Age: 59
Discharge: HOME | End: 2024-10-08
Payer: COMMERCIAL

## 2024-10-08 ENCOUNTER — OFFICE VISIT (OUTPATIENT)
Dept: ORTHOPEDIC SURGERY | Facility: CLINIC | Age: 59
End: 2024-10-08
Payer: COMMERCIAL

## 2024-10-08 DIAGNOSIS — Z96.651 S/P TOTAL KNEE ARTHROPLASTY, RIGHT: ICD-10-CM

## 2024-10-08 PROCEDURE — 3061F NEG MICROALBUMINURIA REV: CPT | Performed by: STUDENT IN AN ORGANIZED HEALTH CARE EDUCATION/TRAINING PROGRAM

## 2024-10-08 PROCEDURE — 99213 OFFICE O/P EST LOW 20 MIN: CPT | Performed by: STUDENT IN AN ORGANIZED HEALTH CARE EDUCATION/TRAINING PROGRAM

## 2024-10-08 PROCEDURE — 73562 X-RAY EXAM OF KNEE 3: CPT | Performed by: RADIOLOGY

## 2024-10-08 PROCEDURE — 3044F HG A1C LEVEL LT 7.0%: CPT | Performed by: STUDENT IN AN ORGANIZED HEALTH CARE EDUCATION/TRAINING PROGRAM

## 2024-10-08 PROCEDURE — 3048F LDL-C <100 MG/DL: CPT | Performed by: STUDENT IN AN ORGANIZED HEALTH CARE EDUCATION/TRAINING PROGRAM

## 2024-10-08 PROCEDURE — 77073 BONE LENGTH STUDIES: CPT

## 2024-10-08 PROCEDURE — 1036F TOBACCO NON-USER: CPT | Performed by: STUDENT IN AN ORGANIZED HEALTH CARE EDUCATION/TRAINING PROGRAM

## 2024-10-08 PROCEDURE — 77073 BONE LENGTH STUDIES: CPT | Performed by: RADIOLOGY

## 2024-10-08 PROCEDURE — 73562 X-RAY EXAM OF KNEE 3: CPT | Mod: RT

## 2024-10-08 NOTE — PROGRESS NOTES
Diagnosis: End stage osteoarthritis Right Knee  Procedure Performed: Right Total Knee Arthroplasty   Date of Surgery: October 6, 2023    Subjective:  Migue Adair is here for regularly scheduled follow-up after the above procedure.  Overall, he is not having much pain in this knee anymore.  However, he does complain of occasional instability.  Much of his instability is due to his left knee which is arthritic.  He denies any drainage from surgical incision.  No falls or trauma.  He does use a cane.  He has rare swelling.  It is much less frequent than it was when I last saw him.  He is able to to perform most activities of daily living but he is still limited due to his postoperative stiffness.    There has been no interval change in this patient's past medical, surgical, medications, allergies, family history or social history since the most recent visit to a provider within our department.  14 point review of systems was performed, reviewed, and negative except for pertinent positives documented in the history of present illness.    Physical Examination:   General: Patient is alert and oriented x 3 and appears comfortable.  Gait: Patient ambulates with slight antalgic gait.  Wound: Incision is well healed over the right knee. There is no erythema, incisional drainage, fluctuance, or suture abscess.   Right Knee: ROM 0-110  Stable to varus and valgus stress at 0 and 30 degrees.   Patella tracks midline  Expected post operative swelling and tenderness   Calf: No swelling or tenderness  Able to dorsi-flex and plantar-flex the ankle with appropriate strength. Able to wiggle all toes.   The foot is warm and well perfused with palpable pulses.   Sensation is intact to light touch.     Radiographs: Radiographs today demonstrate press-fit implants.  No signs of failure.  Limb alignment is restored.    Assessment and Plan: Migue Adair is progressing approximately 12 months s/p right total knee arthroplasty.  Patient has  achieved a satisfactory range of motion.  His radiographs today do not demonstrate any obvious sources of failure.    The patient is in the process of applying for disability.  Given the periodic instability that he experiences as well as his relatively decreased range of motion, I do believe that it would be reasonable to move forward with this paperwork.  I have asked patient to send me any supporting documentation that I can complete.    We discussed the importance of antibiotics prior to dental visits.    We also discussed avoiding high-impact activities.    He will see me in 2 years with x-rays.  Sooner if needed.    *This note was created using voice recognition software and was not corrected for typographical or grammatical errors.*

## 2024-10-12 DIAGNOSIS — G62.9 NEUROPATHY: ICD-10-CM

## 2024-10-14 RX ORDER — GABAPENTIN 400 MG/1
400 CAPSULE ORAL 3 TIMES DAILY
Qty: 270 CAPSULE | Refills: 0 | Status: SHIPPED | OUTPATIENT
Start: 2024-10-14

## 2024-10-21 ENCOUNTER — APPOINTMENT (OUTPATIENT)
Dept: ENDOCRINOLOGY | Facility: CLINIC | Age: 59
End: 2024-10-21
Payer: COMMERCIAL

## 2024-10-21 VITALS
RESPIRATION RATE: 16 BRPM | BODY MASS INDEX: 35.09 KG/M2 | HEART RATE: 76 BPM | WEIGHT: 273.4 LBS | HEIGHT: 74 IN | SYSTOLIC BLOOD PRESSURE: 120 MMHG | DIASTOLIC BLOOD PRESSURE: 70 MMHG

## 2024-10-21 DIAGNOSIS — E11.9 TYPE 2 DIABETES MELLITUS WITHOUT COMPLICATION, WITHOUT LONG-TERM CURRENT USE OF INSULIN (MULTI): Primary | ICD-10-CM

## 2024-10-21 DIAGNOSIS — I10 HYPERTENSION, UNSPECIFIED TYPE: ICD-10-CM

## 2024-10-21 DIAGNOSIS — E78.5 HYPERLIPIDEMIA, UNSPECIFIED HYPERLIPIDEMIA TYPE: ICD-10-CM

## 2024-10-21 LAB — POC HEMOGLOBIN A1C: 7 % (ref 4.2–6.5)

## 2024-10-21 PROCEDURE — 3078F DIAST BP <80 MM HG: CPT | Performed by: INTERNAL MEDICINE

## 2024-10-21 PROCEDURE — 3008F BODY MASS INDEX DOCD: CPT | Performed by: INTERNAL MEDICINE

## 2024-10-21 PROCEDURE — 1036F TOBACCO NON-USER: CPT | Performed by: INTERNAL MEDICINE

## 2024-10-21 PROCEDURE — 3048F LDL-C <100 MG/DL: CPT | Performed by: INTERNAL MEDICINE

## 2024-10-21 PROCEDURE — 3074F SYST BP LT 130 MM HG: CPT | Performed by: INTERNAL MEDICINE

## 2024-10-21 PROCEDURE — 83036 HEMOGLOBIN GLYCOSYLATED A1C: CPT | Performed by: INTERNAL MEDICINE

## 2024-10-21 PROCEDURE — 99214 OFFICE O/P EST MOD 30 MIN: CPT | Performed by: INTERNAL MEDICINE

## 2024-10-21 PROCEDURE — 3061F NEG MICROALBUMINURIA REV: CPT | Performed by: INTERNAL MEDICINE

## 2024-10-21 PROCEDURE — 3044F HG A1C LEVEL LT 7.0%: CPT | Performed by: INTERNAL MEDICINE

## 2024-10-21 RX ORDER — DULAGLUTIDE 3 MG/.5ML
3 INJECTION, SOLUTION SUBCUTANEOUS
Qty: 6 ML | Refills: 3 | Status: SHIPPED | OUTPATIENT
Start: 2024-10-27 | End: 2025-10-27

## 2024-10-21 ASSESSMENT — ENCOUNTER SYMPTOMS
NAUSEA: 0
DIZZINESS: 0
VOMITING: 0
CHILLS: 0
DIARRHEA: 0
SHORTNESS OF BREATH: 0
FEVER: 0
LIGHT-HEADEDNESS: 0

## 2024-10-21 NOTE — ASSESSMENT & PLAN NOTE
Orders:    Comprehensive Metabolic Panel; Future    Hemoglobin A1C; Future    POCT glycosylated hemoglobin (Hb A1C) manually resulted    dulaglutide (Trulicity) 3 mg/0.5 mL pen injector; Inject 3 mg under the skin 1 (one) time per week.  a1c today  Must work on eating

## 2024-10-21 NOTE — PROGRESS NOTES
Endocrinology: Follow up visit  Subjective   Patient ID: Miuge Adair is a 59 y.o. male who presents for Diabetes (Type 2 ).    PCP: Tor Nettles DO    HPI  Dm2  Trulicity 3 mg  Metformin 2000 mg every day  Glipizide 5 mg qd  Sglt2 refused due to chronic groin infections    Sugars are good per pt  No lows  Weight up 10 lbs  Knows he needs to tighten up on eating habits    Review of Systems   Constitutional:  Negative for chills and fever.   Respiratory:  Negative for shortness of breath.    Gastrointestinal:  Negative for diarrhea, nausea and vomiting.   Endocrine: Negative for cold intolerance and heat intolerance.   Neurological:  Negative for dizziness and light-headedness.       Patient Active Problem List   Diagnosis    Arm mass    Benign essential hypertension    Bilateral sensorineural hearing loss    Chronic headaches    GERD (gastroesophageal reflux disease)    Hearing loss    Hyperlipidemia    Hypermetropia of both eyes    Lesion of vocal cord    Lipoma of right upper extremity    Myopia of both eyes    Neuropathy    Obstructive sleep apnea, adult    Optic nerve cupping of both eyes    Organic impotence    Primary localized osteoarthritis of both knees    Pulmonary nodules    Skin lump of arm    HTN (hypertension)    Diabetes mellitus, type 2 (Multi)    Obesity    TORSTEN (obstructive sleep apnea)    Peripheral neuropathy    Right knee pain        Home Meds:  Current Outpatient Medications   Medication Instructions    acetaminophen (Tylenol) 500 mg tablet TAKE 1 TABLET EVERY 4 TO 6 HOURS AS NEEDED    atorvastatin (LIPITOR) 10 mg, oral, Daily    chlorhexidine (Peridex) 0.12 % solution     dilTIAZem CD (Cardizem CD) 240 mg 24 hr capsule TAKE 1 CAPSULE DAILY (NEED APPOINTMENT)    FreeStyle Tesfaye sensor system (FreeStyle Tesfaye 14 Day Sensor) kit Use as directed    gabapentin (NEURONTIN) 400 mg, oral, 3 times daily    glipiZIDE (GLUCOTROL) 5 mg, oral, Daily     mg, oral, 3 times daily PRN, Take with food  "   metFORMIN (GLUCOPHAGE) 1,000 mg, oral, 2 times daily    omeprazole (PriLOSEC) 20 mg DR capsule 1 capsule, Daily    Trulicity 3 mg, subcutaneous, Once Weekly    Trulicity 3 mg, subcutaneous, Once Weekly        No Known Allergies     Objective   Vitals:    10/21/24 1252   BP: 120/70   Pulse: 76   Resp: 16      Vitals:    10/21/24 1252   Weight: 124 kg (273 lb 6.4 oz)      Body mass index is 35.1 kg/m².   Physical Exam  Constitutional:       Appearance: Normal appearance. He is overweight.   HENT:      Head: Normocephalic and atraumatic.   Neck:      Thyroid: No thyroid mass, thyromegaly or thyroid tenderness.   Cardiovascular:      Rate and Rhythm: Normal rate and regular rhythm.      Heart sounds: No murmur heard.     No gallop.   Pulmonary:      Effort: Pulmonary effort is normal.      Breath sounds: Normal breath sounds.   Abdominal:      Palpations: Abdomen is soft.      Comments: benign   Neurological:      General: No focal deficit present.      Mental Status: He is alert and oriented to person, place, and time.      Deep Tendon Reflexes: Reflexes are normal and symmetric.   Psychiatric:         Behavior: Behavior is cooperative.         Labs:  Lab Results   Component Value Date    HGBA1C 6.8 (H) 07/11/2024    TSH 1.77 07/11/2024    FREET4 0.84 07/11/2024      No results found for: \"PR1\", \"THYROIDPAB\", \"TSI\"     Assessment/Plan   Assessment & Plan  Type 2 diabetes mellitus without complication, without long-term current use of insulin (Multi)    Orders:    Comprehensive Metabolic Panel; Future    Hemoglobin A1C; Future    POCT glycosylated hemoglobin (Hb A1C) manually resulted    dulaglutide (Trulicity) 3 mg/0.5 mL pen injector; Inject 3 mg under the skin 1 (one) time per week.  a1c today  Must work on eating  Hyperlipidemia, unspecified hyperlipidemia type    Stable on statin  Hypertension, unspecified type    Bp excellent        Electronically signed by:  nAnamaria Alonso MD 10/21/24 12:52 PM              "

## 2024-11-23 DIAGNOSIS — G89.29 CHRONIC PAIN OF BOTH KNEES: ICD-10-CM

## 2024-11-23 DIAGNOSIS — M25.562 CHRONIC PAIN OF BOTH KNEES: ICD-10-CM

## 2024-11-23 DIAGNOSIS — M25.561 CHRONIC PAIN OF BOTH KNEES: ICD-10-CM

## 2024-11-25 RX ORDER — ACETAMINOPHEN 500 MG
TABLET ORAL
Qty: 120 TABLET | Refills: 0 | Status: SHIPPED | OUTPATIENT
Start: 2024-11-25

## 2024-12-16 DIAGNOSIS — I10 BENIGN ESSENTIAL HYPERTENSION: ICD-10-CM

## 2024-12-19 ENCOUNTER — APPOINTMENT (OUTPATIENT)
Dept: HEMATOLOGY/ONCOLOGY | Facility: HOSPITAL | Age: 59
End: 2024-12-19
Payer: COMMERCIAL

## 2024-12-20 RX ORDER — DILTIAZEM HYDROCHLORIDE 240 MG/1
CAPSULE, COATED, EXTENDED RELEASE ORAL
Qty: 90 CAPSULE | Refills: 0 | Status: SHIPPED | OUTPATIENT
Start: 2024-12-20

## 2025-01-06 ENCOUNTER — TELEPHONE (OUTPATIENT)
Dept: PRIMARY CARE | Facility: CLINIC | Age: 60
End: 2025-01-06
Payer: COMMERCIAL

## 2025-01-10 ENCOUNTER — APPOINTMENT (OUTPATIENT)
Dept: PRIMARY CARE | Facility: CLINIC | Age: 60
End: 2025-01-10
Payer: COMMERCIAL

## 2025-01-10 VITALS — BODY MASS INDEX: 35.69 KG/M2 | DIASTOLIC BLOOD PRESSURE: 69 MMHG | WEIGHT: 278 LBS | SYSTOLIC BLOOD PRESSURE: 109 MMHG

## 2025-01-10 DIAGNOSIS — E78.5 HYPERLIPIDEMIA, UNSPECIFIED HYPERLIPIDEMIA TYPE: ICD-10-CM

## 2025-01-10 DIAGNOSIS — E11.9 TYPE 2 DIABETES MELLITUS WITHOUT COMPLICATION, WITHOUT LONG-TERM CURRENT USE OF INSULIN (MULTI): ICD-10-CM

## 2025-01-10 DIAGNOSIS — M25.562 CHRONIC PAIN OF BOTH KNEES: ICD-10-CM

## 2025-01-10 DIAGNOSIS — I10 PRIMARY HYPERTENSION: ICD-10-CM

## 2025-01-10 DIAGNOSIS — G62.9 NEUROPATHY: ICD-10-CM

## 2025-01-10 DIAGNOSIS — G89.29 CHRONIC PAIN OF BOTH KNEES: ICD-10-CM

## 2025-01-10 DIAGNOSIS — Z00.00 PHYSICAL EXAM: ICD-10-CM

## 2025-01-10 DIAGNOSIS — I10 BENIGN ESSENTIAL HYPERTENSION: ICD-10-CM

## 2025-01-10 DIAGNOSIS — M25.561 CHRONIC PAIN OF BOTH KNEES: ICD-10-CM

## 2025-01-10 DIAGNOSIS — E11.9 TYPE 2 DIABETES MELLITUS WITHOUT COMPLICATION, WITH LONG-TERM CURRENT USE OF INSULIN (MULTI): ICD-10-CM

## 2025-01-10 DIAGNOSIS — K21.9 GASTROESOPHAGEAL REFLUX DISEASE, UNSPECIFIED WHETHER ESOPHAGITIS PRESENT: Primary | ICD-10-CM

## 2025-01-10 DIAGNOSIS — Z79.4 TYPE 2 DIABETES MELLITUS WITHOUT COMPLICATION, WITH LONG-TERM CURRENT USE OF INSULIN (MULTI): ICD-10-CM

## 2025-01-10 RX ORDER — GLIPIZIDE 5 MG/1
5 TABLET ORAL DAILY
Qty: 90 TABLET | Refills: 3 | Status: SHIPPED | OUTPATIENT
Start: 2025-01-10

## 2025-01-10 RX ORDER — OMEPRAZOLE 20 MG/1
20 CAPSULE, DELAYED RELEASE ORAL DAILY
Qty: 90 CAPSULE | Refills: 1 | Status: SHIPPED | OUTPATIENT
Start: 2025-01-10 | End: 2025-04-10

## 2025-01-10 RX ORDER — ACETAMINOPHEN 500 MG
500 TABLET ORAL EVERY 4 HOURS PRN
Qty: 120 TABLET | Refills: 2 | Status: SHIPPED | OUTPATIENT
Start: 2025-01-10 | End: 2025-02-09

## 2025-01-10 RX ORDER — BLOOD-GLUCOSE,RECEIVER,CONT
EACH MISCELLANEOUS
Qty: 1 EACH | Refills: 2 | Status: SHIPPED | OUTPATIENT
Start: 2025-01-10

## 2025-01-10 RX ORDER — METFORMIN HYDROCHLORIDE 1000 MG/1
1000 TABLET ORAL 2 TIMES DAILY
Qty: 180 TABLET | Refills: 1 | Status: SHIPPED | OUTPATIENT
Start: 2025-01-10

## 2025-01-10 RX ORDER — GABAPENTIN 400 MG/1
400 CAPSULE ORAL 3 TIMES DAILY
Qty: 270 CAPSULE | Refills: 1 | Status: SHIPPED | OUTPATIENT
Start: 2025-01-10

## 2025-01-10 RX ORDER — IBUPROFEN 600 MG/1
600 TABLET ORAL 3 TIMES DAILY PRN
Qty: 270 TABLET | Refills: 1 | Status: SHIPPED | OUTPATIENT
Start: 2025-01-10

## 2025-01-10 RX ORDER — DILTIAZEM HYDROCHLORIDE 240 MG/1
240 CAPSULE, COATED, EXTENDED RELEASE ORAL DAILY
Qty: 90 CAPSULE | Refills: 1 | Status: SHIPPED | OUTPATIENT
Start: 2025-01-10 | End: 2025-04-10

## 2025-01-10 RX ORDER — ATORVASTATIN CALCIUM 10 MG/1
10 TABLET, FILM COATED ORAL DAILY
Qty: 90 TABLET | Refills: 1 | Status: SHIPPED | OUTPATIENT
Start: 2025-01-10

## 2025-01-10 RX ORDER — BLOOD-GLUCOSE SENSOR
EACH MISCELLANEOUS
Qty: 12 EACH | Refills: 2 | Status: SHIPPED | OUTPATIENT
Start: 2025-01-10

## 2025-01-10 RX ORDER — DULAGLUTIDE 3 MG/.5ML
3 INJECTION, SOLUTION SUBCUTANEOUS
Qty: 6 ML | Refills: 3 | Status: SHIPPED | OUTPATIENT
Start: 2025-01-12 | End: 2026-01-12

## 2025-01-10 NOTE — PROGRESS NOTES
Subjective   Patient ID: Migue Adair is a 59 y.o. male who presents for Annual Exam (physical) and Med Refill.  HPI              old male with a PMHx of DM2, GERD, diabetic Neuropathy , status post right arm lipoma removal excision , pulmonary nodules 3 mm and severe right left knee osteoarthritis status post right total knee arthroplasty October 2023, TORSTEN on CPAP    Presents for follow-up he does have severe chronic knee pain for years right greater than left consistent grade 7 out of 10 he did have a right knee replacement recently still has a lot of pain there and swelling he does note some improvement with physical therapy and is doing but the issue still persists  He also has decreased range of motion of the right knee can only bend at the far in flexion  He states he saw a disability doctor in [place] who said his right leg was 3 times the size of the left and was told to use the cane on the other side to help strengthen the other leg  Left knee arthralgia persists worse when he stands on it he cannot walk or work or stand on it due to pain he is not wearing a left leg brace hinged  He has chronic neuropathy pins-and-needles that are worse with standing of the feet that are severe  Denies paralysis trauma          Health Maintenance:      Colonoscopy: 2022 Cologuard due 2025      Mammogram:      Pelvic/Pap:      Low dose chest CT:      Aorta duplex:      Optho:      Podiatry:        Vaccines:      Prevnar 20:      Prevnar 13:      Pneumovax 23:      Tdap:      Shingrix:      COVID:      Influenza:        ROS:      General: 5 pound weight gain denies fever/chills/weight loss      Head: denies HA/trauma/masses/dizziness      Eyes: denies vision change/loss of vision/blurry vision/diplopia/eye pain      Ears: denies hearing loss/tinnitus/otalgia/otorrhea      Nose: denies nasal drainage/anosmia      Throat: denies dysphagia/odynophagia      Lymphatics: denies lymph node swelling      Cardiac: denies  CP/palpitations/orthopnea/PND      Pulmonary: Using his CPAP at night however he needs a new mask denies dyspnea/cough/wheezing      GI: denies abd pain/n/v/diarrhea/melena/hematochezia/hematemesis      : denies dysuria/hematuria/change frequency      Genital: denies genital discharge/lesions      Skin: denies rashes/lesions/masses      MSK: Sometimes notices the left knee ronak less he wears the brace chronic right knee pain and left knee pain left seems worse since he is compensating more with it decreased range of motion of right knee since surgery getting somewhat better with physical therapy denies weakness/swelling/edema/gait imbalance/pain      Neuro: denies paresthesias/seizures/dysarthria      Psych: denies depression/anxiety/suicidal or homicidal ideations            Objective   /69   Wt 126 kg (278 lb)   BMI 35.69 kg/m²      Physical Exam:     General: AO3, NAD     Head: atraumatic/NC     Eyes: EOMI/PERRLA. Negative APD     Ears: TM pearly gray, EAC clear. No lesions or erythema     Nose: symmetric nares, no discharge     Throat: trachea midline, uvula midline pink mucosa. No thyromegaly     Lymphatics: no cervical/supraclavicular/ant or posterior cervical adenopathy/axillary/inguinal adenopathy     Breast: not examined     Chest: no deformity or tenderness to palpation     Pulm: CTA b/l, no wheeze/rhonchi/rales. nonlabored     Cardiac: RRR +s1s2, no m/r/g.      GI: soft, NT/ND. Normoactive Bsx4. No rebound/guarding.     Rectal: no examined     MSK: Antalgic gait with cane right anterior patella scar clean dry intact  decreased range of motion by about 15 degrees flexion extension of knee left knee crepitance leg 5/5 strength UE LE. No edema/clubbing/cyanosis     Skin: no rashes/lesions     Vascular: 2+ palp DP PT radials b/l. Negative carotid bruit     Neuro: Decree sensation to pinprick bilateral feet up to the ankle CNII-XII intact. No focal deficits. Reflexes 2/4 brachioradialis bicep  "tricep patellar achilles. Finger to nose intact.     Psych: appropriate mood/affect                    No results found for: \"BMPR1A\", \"CBCDIF\"      Assessment/Plan   Diagnoses and all orders for this visit:  Gastroesophageal reflux disease, unspecified whether esophagitis present  -     omeprazole (PriLOSEC) 20 mg DR capsule; Take 1 capsule (20 mg) by mouth once daily.  Chronic pain of both knees  -     acetaminophen (Tylenol) 500 mg tablet; Take 1 tablet (500 mg) by mouth every 4 hours if needed for mild pain (1 - 3).  -     ibuprofen (IBU) 600 mg tablet; Take 1 tablet (600 mg) by mouth 3 times a day as needed for mild pain (1 - 3). Take with food  -     Disability Placard  Benign essential hypertension  -     atorvastatin (Lipitor) 10 mg tablet; Take 1 tablet (10 mg) by mouth once daily.  -     dilTIAZem CD (Cardizem CD) 240 mg 24 hr capsule; Take 1 capsule (240 mg) by mouth once daily.  Hyperlipidemia, unspecified hyperlipidemia type  -     atorvastatin (Lipitor) 10 mg tablet; Take 1 tablet (10 mg) by mouth once daily.  Type 2 diabetes mellitus without complication, without long-term current use of insulin (Multi)  -     dulaglutide (Trulicity) 3 mg/0.5 mL injection; Inject 3 mg under the skin 1 (one) time per week.  -     Dexcom G7  misc; Use as instructed  -     Dexcom G7 Sensor device; Every 10 days  Neuropathy  -     gabapentin (Neurontin) 400 mg capsule; Take 1 capsule (400 mg) by mouth 3 times a day.  Type 2 diabetes mellitus without complication, with long-term current use of insulin (Multi)  -     glipiZIDE (Glucotrol) 5 mg tablet; Take 1 tablet (5 mg) by mouth once daily.  -     metFORMIN (Glucophage) 1,000 mg tablet; Take 1 tablet (1,000 mg) by mouth 2 times a day.  Primary hypertension  Physical exam      Call and follow-up with orthopedics recommendations noted follow-up in 2 years for x-rays    Endocrinology recommendations noted statin therapy    Prescription given for new sleep apnea " mask    Call for yearly ophthalmology exam    We will plan to order your Cologuard test in July in 6 months    Screening blood work due July 2025    Thank you for making appointment today Migue    Please follow-up 6 months     Tor Nettles DO, RIO Nettles DO

## 2025-04-23 ENCOUNTER — APPOINTMENT (OUTPATIENT)
Dept: OPHTHALMOLOGY | Facility: CLINIC | Age: 60
End: 2025-04-23
Payer: COMMERCIAL

## 2025-04-23 DIAGNOSIS — Z83.511 FAMILY HISTORY OF GLAUCOMA IN BROTHER: ICD-10-CM

## 2025-04-23 DIAGNOSIS — H47.393 SUSPICIOUS OPTIC NERVE CUPPING OF BOTH EYES: Primary | ICD-10-CM

## 2025-04-23 DIAGNOSIS — H52.03 HYPEROPIA OF BOTH EYES: ICD-10-CM

## 2025-04-23 DIAGNOSIS — E11.9 TYPE 2 DIABETES MELLITUS WITHOUT COMPLICATION, WITHOUT LONG-TERM CURRENT USE OF INSULIN: ICD-10-CM

## 2025-04-23 DIAGNOSIS — H52.223 REGULAR ASTIGMATISM OF BOTH EYES: ICD-10-CM

## 2025-04-23 DIAGNOSIS — H52.4 PRESBYOPIA: ICD-10-CM

## 2025-04-23 DIAGNOSIS — H40.003 GLAUCOMA SUSPECT OF BOTH EYES: ICD-10-CM

## 2025-04-23 LAB
AVERAGE RNFL TODAY (OD): 69
AVERAGE RNFL TODAY (OS): 104
C/D RATIO TODAY (OD): 0.76
C/D RATIO TODAY (OS): 0.7

## 2025-04-23 PROCEDURE — 99203 OFFICE O/P NEW LOW 30 MIN: CPT | Performed by: OPHTHALMOLOGY

## 2025-04-23 PROCEDURE — 92015 DETERMINE REFRACTIVE STATE: CPT | Performed by: OPHTHALMOLOGY

## 2025-04-23 PROCEDURE — 92133 CPTRZD OPH DX IMG PST SGM ON: CPT | Performed by: OPHTHALMOLOGY

## 2025-04-23 ASSESSMENT — SLIT LAMP EXAM - LIDS
COMMENTS: GOOD POSITION
COMMENTS: GOOD POSITION

## 2025-04-23 ASSESSMENT — REFRACTION_MANIFEST
OD_SPHERE: +1.25
OD_CYLINDER: -0.75
OS_ADD: +2.50
OS_AXIS: 008
OD_AXIS: 100
OS_SPHERE: +1.00
OD_ADD: +2.50
OS_CYLINDER: -0.50

## 2025-04-23 ASSESSMENT — TONOMETRY
IOP_METHOD: GOLDMANN APPLANATION
OS_IOP_MMHG: 15
OD_IOP_MMHG: 13

## 2025-04-23 ASSESSMENT — VISUAL ACUITY
OD_SC: 20/25
OS_SC: 20/25
METHOD: SNELLEN - LINEAR

## 2025-04-23 ASSESSMENT — EXTERNAL EXAM - RIGHT EYE: OD_EXAM: NORMAL

## 2025-04-23 ASSESSMENT — CUP TO DISC RATIO
OS_RATIO: 0.7
OD_RATIO: 0.75

## 2025-04-23 ASSESSMENT — ENCOUNTER SYMPTOMS: EYES NEGATIVE: 1

## 2025-04-23 ASSESSMENT — EXTERNAL EXAM - LEFT EYE: OS_EXAM: NORMAL

## 2025-04-23 NOTE — PROGRESS NOTES
Assessment/Plan   Diagnoses and all orders for this visit:  Suspicious optic nerve cupping of both eyes  -     OCT, Optic Nerve - OU - Both Eyes  Glaucoma suspect of both eyes  Family history of glaucoma in brother    Pt was advised that glaucoma can be a potentially blinding disease which is irreversible and stressed the importance of strict follow up and evaluations especially with a family hx of glaucoma    Refer to glaucoma Service for evaluation for LTG    Type 2 diabetes mellitus without complication, without long-term current use of insulin  -no evidence of diabetic retinopathy at the present time  -pt was advised of the importance of good diabetes control and the importance of a yearly dilated diabetic exam    Hyperopia of both eyes  Regular astigmatism of both eyes  Presbyopia  Refractive error  -give Rx for new glasses    Return in  3  month(s) for follow up or sooner if having any problems  Visual field (VF) and Pachys at next visit unless they were performed at visit with Dr. Torres

## 2025-05-13 DIAGNOSIS — Z79.4 TYPE 2 DIABETES MELLITUS WITHOUT COMPLICATION, WITH LONG-TERM CURRENT USE OF INSULIN: ICD-10-CM

## 2025-05-13 DIAGNOSIS — I10 BENIGN ESSENTIAL HYPERTENSION: ICD-10-CM

## 2025-05-13 DIAGNOSIS — E11.9 TYPE 2 DIABETES MELLITUS WITHOUT COMPLICATION, WITH LONG-TERM CURRENT USE OF INSULIN: ICD-10-CM

## 2025-05-13 DIAGNOSIS — G62.9 NEUROPATHY: ICD-10-CM

## 2025-05-13 DIAGNOSIS — E78.5 HYPERLIPIDEMIA, UNSPECIFIED HYPERLIPIDEMIA TYPE: ICD-10-CM

## 2025-05-13 RX ORDER — GABAPENTIN 400 MG/1
400 CAPSULE ORAL 3 TIMES DAILY
Qty: 270 CAPSULE | Refills: 0 | Status: SHIPPED | OUTPATIENT
Start: 2025-05-13

## 2025-05-13 RX ORDER — METFORMIN HYDROCHLORIDE 1000 MG/1
1000 TABLET ORAL 2 TIMES DAILY
Qty: 180 TABLET | Refills: 0 | Status: SHIPPED | OUTPATIENT
Start: 2025-05-13

## 2025-05-13 RX ORDER — DILTIAZEM HYDROCHLORIDE 240 MG/1
240 CAPSULE, COATED, EXTENDED RELEASE ORAL DAILY
Qty: 90 CAPSULE | Refills: 0 | Status: SHIPPED | OUTPATIENT
Start: 2025-05-13

## 2025-05-14 RX ORDER — ATORVASTATIN CALCIUM 10 MG/1
10 TABLET, FILM COATED ORAL DAILY
Qty: 90 TABLET | Refills: 0 | Status: SHIPPED | OUTPATIENT
Start: 2025-05-14

## 2025-05-21 DIAGNOSIS — Z12.11 SCREEN FOR COLON CANCER: Primary | ICD-10-CM

## 2025-06-03 LAB — NONINV COLON CA DNA+OCC BLD SCRN STL QL: NEGATIVE

## 2025-06-05 ENCOUNTER — APPOINTMENT (OUTPATIENT)
Dept: ENDOCRINOLOGY | Facility: CLINIC | Age: 60
End: 2025-06-05
Payer: COMMERCIAL

## 2025-06-05 VITALS
HEART RATE: 61 BPM | BODY MASS INDEX: 35.01 KG/M2 | HEIGHT: 74 IN | WEIGHT: 272.8 LBS | SYSTOLIC BLOOD PRESSURE: 124 MMHG | DIASTOLIC BLOOD PRESSURE: 66 MMHG | RESPIRATION RATE: 16 BRPM

## 2025-06-05 DIAGNOSIS — E11.9 TYPE 2 DIABETES MELLITUS WITHOUT COMPLICATION, WITHOUT LONG-TERM CURRENT USE OF INSULIN: Primary | ICD-10-CM

## 2025-06-05 DIAGNOSIS — E78.5 HYPERLIPIDEMIA, UNSPECIFIED HYPERLIPIDEMIA TYPE: ICD-10-CM

## 2025-06-05 DIAGNOSIS — I10 HYPERTENSION, UNSPECIFIED TYPE: ICD-10-CM

## 2025-06-05 PROCEDURE — 1036F TOBACCO NON-USER: CPT | Performed by: INTERNAL MEDICINE

## 2025-06-05 PROCEDURE — 3008F BODY MASS INDEX DOCD: CPT | Performed by: INTERNAL MEDICINE

## 2025-06-05 PROCEDURE — 99214 OFFICE O/P EST MOD 30 MIN: CPT | Performed by: INTERNAL MEDICINE

## 2025-06-05 PROCEDURE — 3078F DIAST BP <80 MM HG: CPT | Performed by: INTERNAL MEDICINE

## 2025-06-05 PROCEDURE — 3074F SYST BP LT 130 MM HG: CPT | Performed by: INTERNAL MEDICINE

## 2025-06-05 ASSESSMENT — ENCOUNTER SYMPTOMS
LIGHT-HEADEDNESS: 0
FEVER: 0
NAUSEA: 0
CHILLS: 0
VOMITING: 0
SHORTNESS OF BREATH: 0
DIARRHEA: 0
DIZZINESS: 0

## 2025-06-05 NOTE — ASSESSMENT & PLAN NOTE
Fasting labs due   Room to increase trulicity if needed    Orders:    CBC; Future    Comprehensive Metabolic Panel; Future    Lipid Panel; Future    Hemoglobin A1C; Future    Albumin-Creatinine Ratio, Urine Random; Future

## 2025-06-05 NOTE — PROGRESS NOTES
Endocrinology: Follow up visit  Subjective   Patient ID: Migue Adair is a 60 y.o. male who presents for Diabetes (Type 2 ).    PCP: Tor Nettles DO    HPI  Dm2  Trulicity 3 mg  Metformin 2000 mg every day  Glipizide 5 mg qd  Sglt2 refused due to chronic groin infections    Since last visit reports sugars are good  Rare lows  Feeling fine  Utd with eye exam  Review of Systems   Constitutional:  Negative for chills and fever.   Respiratory:  Negative for shortness of breath.    Gastrointestinal:  Negative for diarrhea, nausea and vomiting.   Endocrine: Negative for cold intolerance and heat intolerance.   Neurological:  Negative for dizziness and light-headedness.       Problem List[1]     Home Meds:  Current Outpatient Medications   Medication Instructions    atorvastatin (LIPITOR) 10 mg, oral, Daily    Dexcom G7  misc Use as instructed    Dexcom G7 Sensor device Every 10 days    dilTIAZem CD (CARDIZEM CD) 240 mg, oral, Daily    FreeStyle Tesfaye sensor system (FreeStyle Tesfaye 14 Day Sensor) kit Use as directed    gabapentin (NEURONTIN) 400 mg, oral, 3 times daily    glipiZIDE (GLUCOTROL) 5 mg, oral, Daily    ibuprofen (IBU) 600 mg, oral, 3 times daily PRN, Take with food    metFORMIN (GLUCOPHAGE) 1,000 mg, oral, 2 times daily    omeprazole (PRILOSEC) 20 mg, oral, Daily    Trulicity 3 mg, subcutaneous, Once Weekly    Trulicity 3 mg, subcutaneous, Once Weekly        RX Allergies[2]     Objective   Vitals:    06/05/25 1408   BP: 124/66   Pulse: 61   Resp: 16      Vitals:    06/05/25 1408   Weight: 124 kg (272 lb 12.8 oz)      Body mass index is 35.03 kg/m².   Physical Exam  Constitutional:       Appearance: Normal appearance. He is overweight.   HENT:      Head: Normocephalic and atraumatic.   Neck:      Thyroid: No thyroid mass, thyromegaly or thyroid tenderness.   Cardiovascular:      Rate and Rhythm: Normal rate and regular rhythm.      Heart sounds: No murmur heard.     No gallop.   Pulmonary:      Effort:  "Pulmonary effort is normal.      Breath sounds: Normal breath sounds.   Abdominal:      Palpations: Abdomen is soft.      Comments: benign   Neurological:      General: No focal deficit present.      Mental Status: He is alert and oriented to person, place, and time.      Deep Tendon Reflexes: Reflexes are normal and symmetric.   Psychiatric:         Behavior: Behavior is cooperative.         Labs:  Lab Results   Component Value Date    HGBA1C 7.0 (A) 10/21/2024    TSH 1.77 07/11/2024    FREET4 0.84 07/11/2024      No results found for: \"PR1\", \"THYROIDPAB\", \"TSI\"     Assessment/Plan   Assessment & Plan  Type 2 diabetes mellitus without complication, without long-term current use of insulin  Fasting labs due   Room to increase trulicity if needed    Orders:    CBC; Future    Comprehensive Metabolic Panel; Future    Lipid Panel; Future    Hemoglobin A1C; Future    Albumin-Creatinine Ratio, Urine Random; Future    Hyperlipidemia, unspecified hyperlipidemia type    Stable on statin: due for recheck  Hypertension, unspecified type    Bp excellent      Electronically signed by:  Annamaria Alonso MD 06/05/25 2:09 PM                   [1]   Patient Active Problem List  Diagnosis    Arm mass    Benign essential hypertension    Bilateral sensorineural hearing loss    Chronic headaches    GERD (gastroesophageal reflux disease)    Hearing loss    Hyperlipidemia    Hypermetropia of both eyes    Lesion of vocal cord    Lipoma of right upper extremity    Myopia of both eyes    Neuropathy    Obstructive sleep apnea, adult    Optic nerve cupping of both eyes    Organic impotence    Primary localized osteoarthritis of both knees    Pulmonary nodules    Skin lump of arm    HTN (hypertension)    Diabetes mellitus, type 2 (Multi)    Obesity    TORSTEN (obstructive sleep apnea)    Peripheral neuropathy    Right knee pain   [2] No Known Allergies    "

## 2025-06-14 DIAGNOSIS — E11.9 TYPE 2 DIABETES MELLITUS WITHOUT COMPLICATION, WITHOUT LONG-TERM CURRENT USE OF INSULIN: Primary | ICD-10-CM

## 2025-06-14 LAB
ALBUMIN SERPL-MCNC: 4.2 G/DL (ref 3.6–5.1)
ALBUMIN/CREAT UR: NORMAL
ALP SERPL-CCNC: 77 U/L (ref 35–144)
ALT SERPL-CCNC: 22 U/L (ref 9–46)
ANION GAP SERPL CALCULATED.4IONS-SCNC: 8 MMOL/L (CALC) (ref 7–17)
AST SERPL-CCNC: 20 U/L (ref 10–35)
BILIRUB SERPL-MCNC: 0.4 MG/DL (ref 0.2–1.2)
BUN SERPL-MCNC: 14 MG/DL (ref 7–25)
CALCIUM SERPL-MCNC: 9.1 MG/DL (ref 8.6–10.3)
CHLORIDE SERPL-SCNC: 103 MMOL/L (ref 98–110)
CHOLEST SERPL-MCNC: 165 MG/DL
CHOLEST/HDLC SERPL: 3.4 (CALC)
CO2 SERPL-SCNC: 27 MMOL/L (ref 20–32)
CREAT SERPL-MCNC: 1.02 MG/DL (ref 0.7–1.35)
CREAT UR-MCNC: NORMAL MG/DL
EGFRCR SERPLBLD CKD-EPI 2021: 84 ML/MIN/1.73M2
ERYTHROCYTE [DISTWIDTH] IN BLOOD BY AUTOMATED COUNT: 14.2 % (ref 11–15)
EST. AVERAGE GLUCOSE BLD GHB EST-MCNC: 171 MG/DL
EST. AVERAGE GLUCOSE BLD GHB EST-SCNC: 9.5 MMOL/L
GLUCOSE SERPL-MCNC: 128 MG/DL (ref 65–139)
HBA1C MFR BLD: 7.6 %
HCT VFR BLD AUTO: 40.3 % (ref 38.5–50)
HDLC SERPL-MCNC: 49 MG/DL
HGB BLD-MCNC: 12.6 G/DL (ref 13.2–17.1)
LDLC SERPL CALC-MCNC: 100 MG/DL (CALC)
MCH RBC QN AUTO: 29 PG (ref 27–33)
MCHC RBC AUTO-ENTMCNC: 31.3 G/DL (ref 32–36)
MCV RBC AUTO: 92.9 FL (ref 80–100)
MICROALBUMIN UR-MCNC: NORMAL
NONHDLC SERPL-MCNC: 116 MG/DL (CALC)
PLATELET # BLD AUTO: 260 THOUSAND/UL (ref 140–400)
PMV BLD REES-ECKER: 9.5 FL (ref 7.5–12.5)
POTASSIUM SERPL-SCNC: 4.7 MMOL/L (ref 3.5–5.3)
PROT SERPL-MCNC: 6.9 G/DL (ref 6.1–8.1)
RBC # BLD AUTO: 4.34 MILLION/UL (ref 4.2–5.8)
SODIUM SERPL-SCNC: 138 MMOL/L (ref 135–146)
TRIGL SERPL-MCNC: 73 MG/DL
WBC # BLD AUTO: 5.3 THOUSAND/UL (ref 3.8–10.8)

## 2025-06-14 RX ORDER — DULAGLUTIDE 4.5 MG/.5ML
4.5 INJECTION, SOLUTION SUBCUTANEOUS WEEKLY
Qty: 6 ML | Refills: 3 | Status: SHIPPED | OUTPATIENT
Start: 2025-06-14

## 2025-06-16 LAB
ALBUMIN SERPL-MCNC: 4.2 G/DL (ref 3.6–5.1)
ALBUMIN/CREAT UR: 5 MG/G CREAT
ALP SERPL-CCNC: 77 U/L (ref 35–144)
ALT SERPL-CCNC: 22 U/L (ref 9–46)
ANION GAP SERPL CALCULATED.4IONS-SCNC: 8 MMOL/L (CALC) (ref 7–17)
AST SERPL-CCNC: 20 U/L (ref 10–35)
BILIRUB SERPL-MCNC: 0.4 MG/DL (ref 0.2–1.2)
BUN SERPL-MCNC: 14 MG/DL (ref 7–25)
CALCIUM SERPL-MCNC: 9.1 MG/DL (ref 8.6–10.3)
CHLORIDE SERPL-SCNC: 103 MMOL/L (ref 98–110)
CHOLEST SERPL-MCNC: 165 MG/DL
CHOLEST/HDLC SERPL: 3.4 (CALC)
CO2 SERPL-SCNC: 27 MMOL/L (ref 20–32)
CREAT SERPL-MCNC: 1.02 MG/DL (ref 0.7–1.35)
CREAT UR-MCNC: 115 MG/DL (ref 20–320)
EGFRCR SERPLBLD CKD-EPI 2021: 84 ML/MIN/1.73M2
ERYTHROCYTE [DISTWIDTH] IN BLOOD BY AUTOMATED COUNT: 14.2 % (ref 11–15)
EST. AVERAGE GLUCOSE BLD GHB EST-MCNC: 171 MG/DL
EST. AVERAGE GLUCOSE BLD GHB EST-SCNC: 9.5 MMOL/L
GLUCOSE SERPL-MCNC: 128 MG/DL (ref 65–139)
HBA1C MFR BLD: 7.6 %
HCT VFR BLD AUTO: 40.3 % (ref 38.5–50)
HDLC SERPL-MCNC: 49 MG/DL
HGB BLD-MCNC: 12.6 G/DL (ref 13.2–17.1)
LDLC SERPL CALC-MCNC: 100 MG/DL (CALC)
MCH RBC QN AUTO: 29 PG (ref 27–33)
MCHC RBC AUTO-ENTMCNC: 31.3 G/DL (ref 32–36)
MCV RBC AUTO: 92.9 FL (ref 80–100)
MICROALBUMIN UR-MCNC: 0.6 MG/DL
NONHDLC SERPL-MCNC: 116 MG/DL (CALC)
PLATELET # BLD AUTO: 260 THOUSAND/UL (ref 140–400)
PMV BLD REES-ECKER: 9.5 FL (ref 7.5–12.5)
POTASSIUM SERPL-SCNC: 4.7 MMOL/L (ref 3.5–5.3)
PROT SERPL-MCNC: 6.9 G/DL (ref 6.1–8.1)
RBC # BLD AUTO: 4.34 MILLION/UL (ref 4.2–5.8)
SODIUM SERPL-SCNC: 138 MMOL/L (ref 135–146)
TRIGL SERPL-MCNC: 73 MG/DL
WBC # BLD AUTO: 5.3 THOUSAND/UL (ref 3.8–10.8)

## 2025-07-09 ENCOUNTER — APPOINTMENT (OUTPATIENT)
Dept: OPHTHALMOLOGY | Facility: CLINIC | Age: 60
End: 2025-07-09
Payer: COMMERCIAL

## 2025-07-10 ENCOUNTER — APPOINTMENT (OUTPATIENT)
Dept: PRIMARY CARE | Facility: CLINIC | Age: 60
End: 2025-07-10
Payer: COMMERCIAL

## 2025-07-10 PROCEDURE — NOSHO NO SHOW VISIT: Performed by: INTERNAL MEDICINE

## 2025-07-20 DIAGNOSIS — I10 BENIGN ESSENTIAL HYPERTENSION: ICD-10-CM

## 2025-07-20 DIAGNOSIS — E11.9 TYPE 2 DIABETES MELLITUS WITHOUT COMPLICATION, WITH LONG-TERM CURRENT USE OF INSULIN: ICD-10-CM

## 2025-07-20 DIAGNOSIS — E78.5 HYPERLIPIDEMIA, UNSPECIFIED HYPERLIPIDEMIA TYPE: ICD-10-CM

## 2025-07-20 DIAGNOSIS — Z79.4 TYPE 2 DIABETES MELLITUS WITHOUT COMPLICATION, WITH LONG-TERM CURRENT USE OF INSULIN: ICD-10-CM

## 2025-07-21 RX ORDER — METFORMIN HYDROCHLORIDE 1000 MG/1
1000 TABLET ORAL 2 TIMES DAILY
Qty: 180 TABLET | Refills: 0 | Status: SHIPPED | OUTPATIENT
Start: 2025-07-21

## 2025-07-21 RX ORDER — DILTIAZEM HYDROCHLORIDE 240 MG/1
240 CAPSULE, COATED, EXTENDED RELEASE ORAL DAILY
Qty: 90 CAPSULE | Refills: 0 | Status: SHIPPED | OUTPATIENT
Start: 2025-07-21

## 2025-07-21 RX ORDER — ATORVASTATIN CALCIUM 10 MG/1
10 TABLET, FILM COATED ORAL DAILY
Qty: 90 TABLET | Refills: 0 | Status: SHIPPED | OUTPATIENT
Start: 2025-07-21

## 2025-07-29 DIAGNOSIS — G62.9 NEUROPATHY: ICD-10-CM

## 2025-07-30 ENCOUNTER — APPOINTMENT (OUTPATIENT)
Dept: OPHTHALMOLOGY | Facility: CLINIC | Age: 60
End: 2025-07-30
Payer: COMMERCIAL

## 2025-07-30 DIAGNOSIS — H40.003 GLAUCOMA SUSPECT OF BOTH EYES: Primary | ICD-10-CM

## 2025-07-30 DIAGNOSIS — H47.393 SUSPICIOUS OPTIC NERVE CUPPING OF BOTH EYES: ICD-10-CM

## 2025-07-30 DIAGNOSIS — Z83.511 FAMILY HISTORY OF GLAUCOMA IN BROTHER: ICD-10-CM

## 2025-07-30 DIAGNOSIS — E11.9 TYPE 2 DIABETES MELLITUS WITHOUT COMPLICATION, WITHOUT LONG-TERM CURRENT USE OF INSULIN: ICD-10-CM

## 2025-07-30 PROCEDURE — 99213 OFFICE O/P EST LOW 20 MIN: CPT | Performed by: OPHTHALMOLOGY

## 2025-07-30 PROCEDURE — 92083 EXTENDED VISUAL FIELD XM: CPT | Performed by: OPHTHALMOLOGY

## 2025-07-30 RX ORDER — GABAPENTIN 400 MG/1
400 CAPSULE ORAL 3 TIMES DAILY
Qty: 270 CAPSULE | Refills: 0 | Status: SHIPPED | OUTPATIENT
Start: 2025-07-30

## 2025-07-30 ASSESSMENT — TONOMETRY
OS_IOP_MMHG: 15
OD_IOP_MMHG: 13
IOP_METHOD: GOLDMANN APPLANATION

## 2025-07-30 ASSESSMENT — PACHYMETRY
OS_CT(UM): 554
OD_CT(UM): 548

## 2025-07-30 ASSESSMENT — VISUAL ACUITY
METHOD: SNELLEN - LINEAR
OD_CC: 20/20
OS_CC: 20/20

## 2025-07-30 ASSESSMENT — SLIT LAMP EXAM - LIDS
COMMENTS: GOOD POSITION
COMMENTS: GOOD POSITION

## 2025-07-30 ASSESSMENT — EXTERNAL EXAM - RIGHT EYE: OD_EXAM: NORMAL

## 2025-07-30 ASSESSMENT — CUP TO DISC RATIO
OS_RATIO: 0.7
OD_RATIO: 0.75

## 2025-07-30 ASSESSMENT — ENCOUNTER SYMPTOMS: EYES NEGATIVE: 1

## 2025-07-30 ASSESSMENT — EXTERNAL EXAM - LEFT EYE: OS_EXAM: NORMAL

## 2025-07-30 NOTE — PROGRESS NOTES
Assessment/Plan   Diagnoses and all orders for this visit:  Glaucoma suspect of both eyes  Stable intraocular pressure (IOP)  continue to monitor  Visual field (VF) wnl today    Family history of glaucoma in brother  continue to monitor    Suspicious optic nerve cupping of both eyes  continue to monitor    Type 2 diabetes mellitus without complication, without long-term current use of insulin  -no evidence of diabetic retinopathy at the present time  -pt was advised of the importance of good diabetes control and the importance of a yearly dilated diabetic exam    Return in  6  month(s) for follow up or sooner if having any problems

## 2025-10-09 ENCOUNTER — APPOINTMENT (OUTPATIENT)
Dept: PRIMARY CARE | Facility: CLINIC | Age: 60
End: 2025-10-09
Payer: COMMERCIAL

## 2026-02-03 ENCOUNTER — APPOINTMENT (OUTPATIENT)
Dept: OPHTHALMOLOGY | Facility: CLINIC | Age: 61
End: 2026-02-03
Payer: COMMERCIAL

## (undated) DEVICE — STRIP, SKIN CLOSURE, STERI STRIP, REINFORCED, 0.5 X 4 IN

## (undated) DEVICE — Device

## (undated) DEVICE — SYRINGE, 60 CC, IRRIGATION, BULB, CONTRO-BULB, PAPER POUCH

## (undated) DEVICE — TUBING, SMOKE EVAC, 3/8 X 10 FT

## (undated) DEVICE — CUFF, TOURNIQUET, 30 X 4, DUAL PORT/SNGL BLADDER, DISP, LF

## (undated) DEVICE — BANDAGE, COFLEX, 6 X 5 YDS, TAN, STERILE, LF

## (undated) DEVICE — DRAPE, INSTRUMENT, W/POUCH, STERI DRAPE, 7 X 11 IN, DISPOSABLE, STERILE

## (undated) DEVICE — SUTURE, CTD, VICRYL, 2-0, UND, BR, CT-2

## (undated) DEVICE — TRAY, DRY PREP, PREMIUM

## (undated) DEVICE — GLOVE, SURGICAL, PROTEXIS PI , 8.0, PF, LF

## (undated) DEVICE — DRAPE, SHEET, U, W/ADHESIVE STRIP, IMPERVIOUS, 60 X 70 IN, DISPOSABLE, LF, STERILE

## (undated) DEVICE — SUTURE, VICRYL, 1, 27 IN, CT-1, VIOLET

## (undated) DEVICE — SUTURE, ETHIBOND, P2, V-37, 30 IN, GREEN

## (undated) DEVICE — SOLUTION, TOPICAL, ALCOHOL, 70% ISOPROPYL, 4OZ

## (undated) DEVICE — TIP, SUCTION, YANKAUER, FLEXIBLE

## (undated) DEVICE — ADULT REM POLYHESIVE II PATIENT RETURN ELECTRODE W/9 FT (2.7 M) ATTACHED CORD

## (undated) DEVICE — SUTURE, MONOCRYL, 3-0, 27 IN, PS-2, UNDYED

## (undated) DEVICE — SOLUTION, CHLORHEXIDINE, 4%, 4OZ

## (undated) DEVICE — DRESSING, MEPILEX FOAM BORDER AG, SILVER, 4 X 4

## (undated) DEVICE — HOOD, SURGICAL, FLYTE HYBRID

## (undated) DEVICE — CEMENT, MIXEVAC III, 10S BOWL, KNEES

## (undated) DEVICE — DRAPE, INCISE, ANTIMICROBIAL, IOBAN 2, STERI DRAPE, 23 X 33 IN, DISPOSABLE, STERILE

## (undated) DEVICE — BLANKET, LOWER BODY, VHA PLUS, ADULT